# Patient Record
Sex: FEMALE | Race: WHITE | NOT HISPANIC OR LATINO | Employment: FULL TIME | URBAN - METROPOLITAN AREA
[De-identification: names, ages, dates, MRNs, and addresses within clinical notes are randomized per-mention and may not be internally consistent; named-entity substitution may affect disease eponyms.]

---

## 2023-03-13 LAB
THYROTROPIN (MIU/L) IN SER/PLAS BY DETECTION LIMIT <= 0.05 MIU/L: 5.3 MIU/L (ref 0.44–3.98)
THYROXINE (T4) FREE (NG/DL) IN SER/PLAS: 0.86 NG/DL (ref 0.61–1.12)

## 2023-04-10 LAB — PROGESTERONE (NG/ML) IN SER/PLAS: 8.6 NG/ML

## 2023-04-24 LAB — THYROTROPIN (MIU/L) IN SER/PLAS BY DETECTION LIMIT <= 0.05 MIU/L: 2.54 MIU/L (ref 0.44–3.98)

## 2023-05-08 LAB — PROGESTERONE (NG/ML) IN SER/PLAS: 1.2 NG/ML

## 2023-05-16 LAB
ESTRADIOL (PG/ML) IN SER/PLAS: 37 PG/ML
PROGESTERONE (NG/ML) IN SER/PLAS: 1.6 NG/ML

## 2023-05-19 LAB
ESTRADIOL (PG/ML) IN SER/PLAS: 171 PG/ML
LUTEINIZING HORMONE (IU/ML) IN SER/PLAS: 5.5 IU/L
PROGESTERONE (NG/ML) IN SER/PLAS: 0.7 NG/ML

## 2023-05-20 LAB
ESTRADIOL (PG/ML) IN SER/PLAS: 236 PG/ML
ESTRADIOL (PG/ML) IN SER/PLAS: NORMAL
LUTEINIZING HORMONE (IU/ML) IN SER/PLAS: 6 IU/L
LUTEINIZING HORMONE (IU/ML) IN SER/PLAS: NORMAL
PROGESTERONE (NG/ML) IN SER/PLAS: 0.3 NG/ML
PROGESTERONE (NG/ML) IN SER/PLAS: NORMAL

## 2023-06-06 LAB — CHORIOGONADOTROPIN (MIU/ML) IN SER/PLAS: 382 MIU/ML

## 2023-06-13 LAB — CHORIOGONADOTROPIN (MIU/ML) IN SER/PLAS: 6246 MIU/ML

## 2023-07-06 LAB
ERYTHROCYTE DISTRIBUTION WIDTH (RATIO) BY AUTOMATED COUNT: 13.4 % (ref 11.5–14.5)
ERYTHROCYTE MEAN CORPUSCULAR HEMOGLOBIN CONCENTRATION (G/DL) BY AUTOMATED: 33.2 G/DL (ref 32–36)
ERYTHROCYTE MEAN CORPUSCULAR VOLUME (FL) BY AUTOMATED COUNT: 88 FL (ref 80–100)
ERYTHROCYTES (10*6/UL) IN BLOOD BY AUTOMATED COUNT: 4.21 X10E12/L (ref 4–5.2)
HEMATOCRIT (%) IN BLOOD BY AUTOMATED COUNT: 37 % (ref 36–46)
HEMOGLOBIN (G/DL) IN BLOOD: 12.3 G/DL (ref 12–16)
HEPATITIS B VIRUS SURFACE AG PRESENCE IN SERUM: NONREACTIVE
HEPATITIS C VIRUS AB PRESENCE IN SERUM: NONREACTIVE
HIV 1/ 2 AG/AB SCREEN: NONREACTIVE
LEUKOCYTES (10*3/UL) IN BLOOD BY AUTOMATED COUNT: 6.9 X10E9/L (ref 4.4–11.3)
NRBC (PER 100 WBCS) BY AUTOMATED COUNT: 0 /100 WBC (ref 0–0)
PLATELETS (10*3/UL) IN BLOOD AUTOMATED COUNT: 276 X10E9/L (ref 150–450)
REFLEX ADDED, ANEMIA PANEL: NORMAL
RUBELLA VIRUS IGG AB: POSITIVE
SYPHILIS TOTAL AB: NONREACTIVE
THYROTROPIN (MIU/L) IN SER/PLAS BY DETECTION LIMIT <= 0.05 MIU/L: 2.53 MIU/L (ref 0.44–3.98)

## 2023-07-10 LAB
ABO GROUP (TYPE) IN BLOOD: NORMAL
ANTIBODY SCREEN: NORMAL
RH FACTOR: NORMAL

## 2023-07-21 LAB
CHLAMYDIA TRACH., AMPLIFIED: NEGATIVE
N. GONORRHEA, AMPLIFIED: NEGATIVE
URINE CULTURE: NORMAL

## 2023-10-05 VITALS — BODY MASS INDEX: 26.87 KG/M2 | WEIGHT: 159 LBS | SYSTOLIC BLOOD PRESSURE: 104 MMHG | DIASTOLIC BLOOD PRESSURE: 60 MMHG

## 2023-10-05 PROBLEM — L68.0 HIRSUTISM: Status: ACTIVE | Noted: 2023-10-05

## 2023-10-05 PROBLEM — E03.9 HYPOTHYROIDISM: Status: ACTIVE | Noted: 2023-10-05

## 2023-10-05 PROBLEM — L85.8 KERATOSIS PILARIS: Status: ACTIVE | Noted: 2023-10-05

## 2023-10-05 PROBLEM — T78.40XA ACUTE ALLERGIC REACTION: Status: ACTIVE | Noted: 2023-10-05

## 2023-10-05 PROBLEM — N63.0 BREAST NODULE: Status: ACTIVE | Noted: 2023-10-05

## 2023-10-05 PROBLEM — R45.89 MOODY: Status: ACTIVE | Noted: 2023-10-05

## 2023-10-05 PROBLEM — K59.09 INTERMITTENT CONSTIPATION: Status: ACTIVE | Noted: 2023-10-05

## 2023-10-05 PROBLEM — E66.3 OVERWEIGHT: Status: ACTIVE | Noted: 2023-10-05

## 2023-10-05 PROBLEM — H61.21 IMPACTED CERUMEN OF RIGHT EAR: Status: ACTIVE | Noted: 2023-10-05

## 2023-10-05 RX ORDER — ASPIRIN 325 MG
325 TABLET ORAL DAILY
COMMUNITY
End: 2023-10-22 | Stop reason: HOSPADM

## 2023-10-05 RX ORDER — LEVOTHYROXINE SODIUM 100 UG/1
100 TABLET ORAL
COMMUNITY
End: 2023-10-22 | Stop reason: HOSPADM

## 2023-10-05 NOTE — PROGRESS NOTES
"ENOB \"FEELS GOOD\", spotting 2 days after last scan. This week nothing.    Routine prenatal care.   Prequel, Foresight already done.  GC/CT  urine cx  no pap   mg 12-16 weeks. -Deidre Villalobos 07/20/2023 04:10 PM  "

## 2023-10-16 ENCOUNTER — ROUTINE PRENATAL (OUTPATIENT)
Dept: OBSTETRICS AND GYNECOLOGY | Facility: CLINIC | Age: 38
End: 2023-10-16
Payer: COMMERCIAL

## 2023-10-16 VITALS — WEIGHT: 169 LBS | BODY MASS INDEX: 28.56 KG/M2 | SYSTOLIC BLOOD PRESSURE: 99 MMHG | DIASTOLIC BLOOD PRESSURE: 61 MMHG

## 2023-10-16 DIAGNOSIS — Z34.02 ENCOUNTER FOR PRENATAL CARE IN SECOND TRIMESTER OF FIRST PREGNANCY (HHS-HCC): Primary | ICD-10-CM

## 2023-10-16 DIAGNOSIS — E03.8 HYPOTHYROIDISM DUE TO HASHIMOTO'S THYROIDITIS: ICD-10-CM

## 2023-10-16 DIAGNOSIS — Z3A.22 22 WEEKS GESTATION OF PREGNANCY (HHS-HCC): ICD-10-CM

## 2023-10-16 DIAGNOSIS — N97.9 PRIMARY FEMALE INFERTILITY: ICD-10-CM

## 2023-10-16 DIAGNOSIS — E06.3 HYPOTHYROIDISM DUE TO HASHIMOTO'S THYROIDITIS: ICD-10-CM

## 2023-10-16 DIAGNOSIS — O09.522 MULTIGRAVIDA OF ADVANCED MATERNAL AGE IN SECOND TRIMESTER (HHS-HCC): ICD-10-CM

## 2023-10-16 PROCEDURE — 99214 OFFICE O/P EST MOD 30 MIN: CPT | Performed by: ADVANCED PRACTICE MIDWIFE

## 2023-10-16 PROCEDURE — 0501F PRENATAL FLOW SHEET: CPT | Performed by: ADVANCED PRACTICE MIDWIFE

## 2023-10-16 ASSESSMENT — ENCOUNTER SYMPTOMS
MUSCULOSKELETAL NEGATIVE: 0
EYES NEGATIVE: 0
NEUROLOGICAL NEGATIVE: 0
ENDOCRINE NEGATIVE: 0
RESPIRATORY NEGATIVE: 0
CARDIOVASCULAR NEGATIVE: 0
HEMATOLOGIC/LYMPHATIC NEGATIVE: 0
PSYCHIATRIC NEGATIVE: 0
GASTROINTESTINAL NEGATIVE: 0
CONSTITUTIONAL NEGATIVE: 0
ALLERGIC/IMMUNOLOGIC NEGATIVE: 0

## 2023-10-16 NOTE — PROGRESS NOTES
Assessment/Plan         Discussed flu vaccine and RSV vaccine - patient desires to get at retail pharmacy  28 week labs and TSH with reflex T4 @ [facility]  Reviewed s/sx of PTL, warning signs, fetal movement counts, and when to call provider  Follow up in 4 weeks for a routine prenatal visit.    Tabitha Vazquez, AVA-MAEGAN    Subjective     Pauline Sun is a 38 y.o.  at 22w6d with a working estimated date of delivery of 2024, by Last Menstrual Period who presents for a routine prenatal visit. She denies vaginal bleeding, leakage of fluid, decreased fetal movements, or contractions.    Her pregnancy is complicated by:  Pregnancy Problems (from 23 to present)       No problems associated with this episode.             Objective   Physical Exam  Weight: 76.7 kg (169 lb)  TW.443 kg (12 lb)  Expected Total Weight Gain: 7 kg (15 lb)-11.5 kg (25 lb)   Pregravid BMI: 26.54  BP: 99/61         Postpartum Depression: Not on file       Prenatal Labs  Lab Results   Component Value Date    HGB 12.3 2023    HCT 37.0 2023     2023    ABO A 07/10/2023    LABRH POS 07/10/2023    NEISSGONOAMP NEGATIVE 2023    CHLAMTRACAMP NEGATIVE 2023    SYPHT NONREACTIVE 2023    HEPBSAG NONREACTIVE 2023    HIV1X2 NONREACTIVE 2023    URINECULTURE NO SIGNIFICANT GROWTH. 2023

## 2023-10-22 ENCOUNTER — HOSPITAL ENCOUNTER (INPATIENT)
Facility: HOSPITAL | Age: 38
LOS: 2 days | Discharge: HOME | DRG: 833 | End: 2023-10-24
Attending: OBSTETRICS & GYNECOLOGY | Admitting: OBSTETRICS & GYNECOLOGY
Payer: COMMERCIAL

## 2023-10-22 ENCOUNTER — HOSPITAL ENCOUNTER (OUTPATIENT)
Facility: HOSPITAL | Age: 38
Discharge: HOSPICE | End: 2023-10-22
Attending: OBSTETRICS & GYNECOLOGY | Admitting: OBSTETRICS & GYNECOLOGY
Payer: COMMERCIAL

## 2023-10-22 VITALS
SYSTOLIC BLOOD PRESSURE: 105 MMHG | RESPIRATION RATE: 18 BRPM | DIASTOLIC BLOOD PRESSURE: 63 MMHG | OXYGEN SATURATION: 100 % | TEMPERATURE: 97.7 F | HEART RATE: 80 BPM

## 2023-10-22 DIAGNOSIS — O47.02: Primary | ICD-10-CM

## 2023-10-22 PROBLEM — N93.9 VAGINAL BLEEDING: Status: ACTIVE | Noted: 2023-10-22

## 2023-10-22 LAB
APTT PPP: 27 SECONDS (ref 27–38)
BASOPHILS # BLD AUTO: 0.02 X10*3/UL (ref 0–0.1)
BASOPHILS NFR BLD AUTO: 0.2 %
EOSINOPHIL # BLD AUTO: 0.15 X10*3/UL (ref 0–0.7)
EOSINOPHIL NFR BLD AUTO: 1.8 %
ERYTHROCYTE [DISTWIDTH] IN BLOOD BY AUTOMATED COUNT: 13.6 % (ref 11.5–14.5)
FIBRINOGEN PPP-MCNC: 549 MG/DL (ref 200–400)
HCT VFR BLD AUTO: 31.2 % (ref 36–46)
HGB BLD-MCNC: 10 G/DL (ref 12–16)
IMM GRANULOCYTES # BLD AUTO: 0.11 X10*3/UL (ref 0–0.7)
IMM GRANULOCYTES NFR BLD AUTO: 1.3 % (ref 0–0.9)
INR PPP: 0.9 (ref 0.9–1.1)
LYMPHOCYTES # BLD AUTO: 1.2 X10*3/UL (ref 1.2–4.8)
LYMPHOCYTES NFR BLD AUTO: 14 %
MCH RBC QN AUTO: 28.7 PG (ref 26–34)
MCHC RBC AUTO-ENTMCNC: 32.1 G/DL (ref 32–36)
MCV RBC AUTO: 90 FL (ref 80–100)
MONOCYTES # BLD AUTO: 0.51 X10*3/UL (ref 0.1–1)
MONOCYTES NFR BLD AUTO: 6 %
NEUTROPHILS # BLD AUTO: 6.56 X10*3/UL (ref 1.2–7.7)
NEUTROPHILS NFR BLD AUTO: 76.7 %
NRBC BLD-RTO: 0 /100 WBCS (ref 0–0)
PLATELET # BLD AUTO: 302 X10*3/UL (ref 150–450)
PMV BLD AUTO: 10.1 FL (ref 7.5–11.5)
PROTHROMBIN TIME: 10.3 SECONDS (ref 9.8–12.8)
RBC # BLD AUTO: 3.48 X10*6/UL (ref 4–5.2)
WBC # BLD AUTO: 8.6 X10*3/UL (ref 4.4–11.3)

## 2023-10-22 PROCEDURE — 96372 THER/PROPH/DIAG INJ SC/IM: CPT | Performed by: OBSTETRICS & GYNECOLOGY

## 2023-10-22 PROCEDURE — 99204 OFFICE O/P NEW MOD 45 MIN: CPT | Performed by: OBSTETRICS & GYNECOLOGY

## 2023-10-22 PROCEDURE — 87081 CULTURE SCREEN ONLY: CPT | Performed by: OBSTETRICS & GYNECOLOGY

## 2023-10-22 PROCEDURE — 85384 FIBRINOGEN ACTIVITY: CPT | Performed by: OBSTETRICS & GYNECOLOGY

## 2023-10-22 PROCEDURE — 99214 OFFICE O/P EST MOD 30 MIN: CPT

## 2023-10-22 PROCEDURE — 86920 COMPATIBILITY TEST SPIN: CPT

## 2023-10-22 PROCEDURE — 87210 SMEAR WET MOUNT SALINE/INK: CPT | Mod: CMCLAB | Performed by: STUDENT IN AN ORGANIZED HEALTH CARE EDUCATION/TRAINING PROGRAM

## 2023-10-22 PROCEDURE — 85027 COMPLETE CBC AUTOMATED: CPT | Mod: CMCLAB | Performed by: STUDENT IN AN ORGANIZED HEALTH CARE EDUCATION/TRAINING PROGRAM

## 2023-10-22 PROCEDURE — 2500000004 HC RX 250 GENERAL PHARMACY W/ HCPCS (ALT 636 FOR OP/ED): Performed by: OBSTETRICS & GYNECOLOGY

## 2023-10-22 PROCEDURE — 36415 COLL VENOUS BLD VENIPUNCTURE: CPT | Mod: CMCLAB | Performed by: STUDENT IN AN ORGANIZED HEALTH CARE EDUCATION/TRAINING PROGRAM

## 2023-10-22 PROCEDURE — 87081 CULTURE SCREEN ONLY: CPT | Mod: CMCLAB | Performed by: OBSTETRICS & GYNECOLOGY

## 2023-10-22 PROCEDURE — 1120000001 HC OB PRIVATE ROOM DAILY

## 2023-10-22 PROCEDURE — 99223 1ST HOSP IP/OBS HIGH 75: CPT | Performed by: STUDENT IN AN ORGANIZED HEALTH CARE EDUCATION/TRAINING PROGRAM

## 2023-10-22 PROCEDURE — 86900 BLOOD TYPING SEROLOGIC ABO: CPT | Performed by: STUDENT IN AN ORGANIZED HEALTH CARE EDUCATION/TRAINING PROGRAM

## 2023-10-22 PROCEDURE — 36415 COLL VENOUS BLD VENIPUNCTURE: CPT | Performed by: OBSTETRICS & GYNECOLOGY

## 2023-10-22 PROCEDURE — 85610 PROTHROMBIN TIME: CPT | Mod: CMCLAB | Performed by: STUDENT IN AN ORGANIZED HEALTH CARE EDUCATION/TRAINING PROGRAM

## 2023-10-22 PROCEDURE — 85610 PROTHROMBIN TIME: CPT | Performed by: OBSTETRICS & GYNECOLOGY

## 2023-10-22 PROCEDURE — 85384 FIBRINOGEN ACTIVITY: CPT | Mod: CMCLAB | Performed by: STUDENT IN AN ORGANIZED HEALTH CARE EDUCATION/TRAINING PROGRAM

## 2023-10-22 PROCEDURE — 96372 THER/PROPH/DIAG INJ SC/IM: CPT

## 2023-10-22 PROCEDURE — 2580000001 HC RX 258 IV SOLUTIONS: Performed by: OBSTETRICS & GYNECOLOGY

## 2023-10-22 PROCEDURE — 85025 COMPLETE CBC W/AUTO DIFF WBC: CPT | Performed by: OBSTETRICS & GYNECOLOGY

## 2023-10-22 RX ORDER — LIDOCAINE HYDROCHLORIDE 10 MG/ML
0.5 INJECTION INFILTRATION; PERINEURAL ONCE AS NEEDED
Status: DISCONTINUED | OUTPATIENT
Start: 2023-10-22 | End: 2023-10-24 | Stop reason: HOSPADM

## 2023-10-22 RX ORDER — METOCLOPRAMIDE 10 MG/1
10 TABLET ORAL EVERY 6 HOURS PRN
Status: DISCONTINUED | OUTPATIENT
Start: 2023-10-22 | End: 2023-10-24 | Stop reason: HOSPADM

## 2023-10-22 RX ORDER — SIMETHICONE 80 MG
80 TABLET,CHEWABLE ORAL 4 TIMES DAILY PRN
Status: DISCONTINUED | OUTPATIENT
Start: 2023-10-22 | End: 2023-10-24 | Stop reason: HOSPADM

## 2023-10-22 RX ORDER — LEVOTHYROXINE SODIUM 100 UG/1
100 TABLET ORAL
COMMUNITY
End: 2023-10-31

## 2023-10-22 RX ORDER — ONDANSETRON 4 MG/1
4 TABLET, FILM COATED ORAL EVERY 6 HOURS PRN
Status: DISCONTINUED | OUTPATIENT
Start: 2023-10-22 | End: 2023-10-24 | Stop reason: HOSPADM

## 2023-10-22 RX ORDER — ADHESIVE BANDAGE
10 BANDAGE TOPICAL
Status: DISCONTINUED | OUTPATIENT
Start: 2023-10-22 | End: 2023-10-24 | Stop reason: HOSPADM

## 2023-10-22 RX ORDER — METOCLOPRAMIDE HYDROCHLORIDE 5 MG/ML
10 INJECTION INTRAMUSCULAR; INTRAVENOUS EVERY 6 HOURS PRN
Status: DISCONTINUED | OUTPATIENT
Start: 2023-10-22 | End: 2023-10-24 | Stop reason: HOSPADM

## 2023-10-22 RX ORDER — LABETALOL HYDROCHLORIDE 5 MG/ML
20 INJECTION, SOLUTION INTRAVENOUS ONCE AS NEEDED
Status: DISCONTINUED | OUTPATIENT
Start: 2023-10-22 | End: 2023-10-24 | Stop reason: HOSPADM

## 2023-10-22 RX ORDER — HYDRALAZINE HYDROCHLORIDE 20 MG/ML
5 INJECTION INTRAMUSCULAR; INTRAVENOUS ONCE AS NEEDED
Status: DISCONTINUED | OUTPATIENT
Start: 2023-10-22 | End: 2023-10-24 | Stop reason: HOSPADM

## 2023-10-22 RX ORDER — LEVOTHYROXINE SODIUM 100 UG/1
100 TABLET ORAL
Status: DISCONTINUED | OUTPATIENT
Start: 2023-10-23 | End: 2023-10-24 | Stop reason: HOSPADM

## 2023-10-22 RX ORDER — NIFEDIPINE 10 MG/1
10 CAPSULE ORAL ONCE AS NEEDED
Status: DISCONTINUED | OUTPATIENT
Start: 2023-10-22 | End: 2023-10-24 | Stop reason: HOSPADM

## 2023-10-22 RX ORDER — SODIUM CHLORIDE, SODIUM LACTATE, POTASSIUM CHLORIDE, CALCIUM CHLORIDE 600; 310; 30; 20 MG/100ML; MG/100ML; MG/100ML; MG/100ML
1000 INJECTION, SOLUTION INTRAVENOUS ONCE
Status: COMPLETED | OUTPATIENT
Start: 2023-10-22 | End: 2023-10-22

## 2023-10-22 RX ORDER — NAPROXEN SODIUM 220 MG/1
162 TABLET, FILM COATED ORAL DAILY
Status: DISCONTINUED | OUTPATIENT
Start: 2023-10-23 | End: 2023-10-24 | Stop reason: HOSPADM

## 2023-10-22 RX ORDER — BISACODYL 10 MG/1
10 SUPPOSITORY RECTAL DAILY PRN
Status: DISCONTINUED | OUTPATIENT
Start: 2023-10-22 | End: 2023-10-24 | Stop reason: HOSPADM

## 2023-10-22 RX ORDER — ONDANSETRON HYDROCHLORIDE 2 MG/ML
4 INJECTION, SOLUTION INTRAVENOUS EVERY 6 HOURS PRN
Status: DISCONTINUED | OUTPATIENT
Start: 2023-10-22 | End: 2023-10-24 | Stop reason: HOSPADM

## 2023-10-22 RX ORDER — POLYETHYLENE GLYCOL 3350 17 G/17G
17 POWDER, FOR SOLUTION ORAL 2 TIMES DAILY PRN
Status: DISCONTINUED | OUTPATIENT
Start: 2023-10-22 | End: 2023-10-24 | Stop reason: HOSPADM

## 2023-10-22 RX ORDER — BETAMETHASONE SODIUM PHOSPHATE AND BETAMETHASONE ACETATE 3; 3 MG/ML; MG/ML
12 INJECTION, SUSPENSION INTRA-ARTICULAR; INTRALESIONAL; INTRAMUSCULAR; SOFT TISSUE ONCE
Status: COMPLETED | OUTPATIENT
Start: 2023-10-22 | End: 2023-10-22

## 2023-10-22 RX ADMIN — BETAMETHASONE SODIUM PHOSPHATE AND BETAMETHASONE ACETATE 12 MG: 3; 3 INJECTION, SUSPENSION INTRA-ARTICULAR; INTRALESIONAL; INTRAMUSCULAR at 18:44

## 2023-10-22 RX ADMIN — SODIUM CHLORIDE, POTASSIUM CHLORIDE, SODIUM LACTATE AND CALCIUM CHLORIDE 1000 ML/HR: 600; 310; 30; 20 INJECTION, SOLUTION INTRAVENOUS at 16:14

## 2023-10-22 RX ADMIN — SODIUM CHLORIDE, SODIUM LACTATE, POTASSIUM CHLORIDE, CALCIUM CHLORIDE AND DEXTROSE MONOHYDRATE 1000 ML: 5; 600; 310; 30; 20 INJECTION, SOLUTION INTRAVENOUS at 18:44

## 2023-10-22 SDOH — HEALTH STABILITY: MENTAL HEALTH: ACTIVE SUICIDAL IDEATION WITH SPECIFIC PLAN AND INTENT (PAST 1 MONTH): NO

## 2023-10-22 SDOH — SOCIAL STABILITY: SOCIAL INSECURITY: ARE YOU OR HAVE YOU BEEN THREATENED OR ABUSED PHYSICALLY, EMOTIONALLY, OR SEXUALLY BY ANYONE?: NO

## 2023-10-22 SDOH — HEALTH STABILITY: MENTAL HEALTH: SUICIDAL BEHAVIOR (LIFETIME): NO

## 2023-10-22 SDOH — HEALTH STABILITY: MENTAL HEALTH: WERE YOU ABLE TO COMPLETE ALL THE BEHAVIORAL HEALTH SCREENINGS?: YES

## 2023-10-22 SDOH — SOCIAL STABILITY: SOCIAL INSECURITY: ARE THERE ANY APPARENT SIGNS OF INJURIES/BEHAVIORS THAT COULD BE RELATED TO ABUSE/NEGLECT?: NO

## 2023-10-22 SDOH — SOCIAL STABILITY: SOCIAL INSECURITY: DO YOU FEEL ANYONE HAS EXPLOITED OR TAKEN ADVANTAGE OF YOU FINANCIALLY OR OF YOUR PERSONAL PROPERTY?: NO

## 2023-10-22 SDOH — SOCIAL STABILITY: SOCIAL INSECURITY: PHYSICAL ABUSE: DENIES

## 2023-10-22 SDOH — HEALTH STABILITY: MENTAL HEALTH: HAVE YOU USED ANY PRESCRIPTION DRUGS OTHER THAN PRESCRIBED IN THE PAST 12 MONTHS?: NO

## 2023-10-22 SDOH — HEALTH STABILITY: MENTAL HEALTH: HAVE YOU USED ANY SUBSTANCES (CANABIS, COCAINE, HEROIN, HALLUCINOGENS, INHALANTS, ETC.) IN THE PAST 12 MONTHS?: NO

## 2023-10-22 SDOH — HEALTH STABILITY: MENTAL HEALTH: WISH TO BE DEAD (PAST 1 MONTH): NO

## 2023-10-22 SDOH — HEALTH STABILITY: MENTAL HEALTH: NON-SPECIFIC ACTIVE SUICIDAL THOUGHTS (PAST 1 MONTH): NO

## 2023-10-22 SDOH — SOCIAL STABILITY: SOCIAL INSECURITY: HAS ANYONE EVER THREATENED TO HURT YOUR FAMILY OR YOUR PETS?: NO

## 2023-10-22 SDOH — SOCIAL STABILITY: SOCIAL INSECURITY: VERBAL ABUSE: DENIES

## 2023-10-22 SDOH — SOCIAL STABILITY: SOCIAL INSECURITY: HAVE YOU HAD THOUGHTS OF HARMING ANYONE ELSE?: NO

## 2023-10-22 SDOH — SOCIAL STABILITY: SOCIAL INSECURITY: DOES ANYONE TRY TO KEEP YOU FROM HAVING/CONTACTING OTHER FRIENDS OR DOING THINGS OUTSIDE YOUR HOME?: NO

## 2023-10-22 SDOH — HEALTH STABILITY: MENTAL HEALTH: ACTIVE SUICIDAL IDEATION WITH SOME INTENT TO ACT, WITHOUT SPECIFIC PLAN (PAST 1 MONTH): NO

## 2023-10-22 SDOH — ECONOMIC STABILITY: HOUSING INSECURITY: DO YOU FEEL UNSAFE GOING BACK TO THE PLACE WHERE YOU ARE LIVING?: NO

## 2023-10-22 SDOH — SOCIAL STABILITY: SOCIAL INSECURITY: ABUSE SCREEN: ADULT

## 2023-10-22 ASSESSMENT — LIFESTYLE VARIABLES
AUDIT-C TOTAL SCORE: 0
HOW OFTEN DO YOU HAVE 6 OR MORE DRINKS ON ONE OCCASION: NEVER
HOW MANY STANDARD DRINKS CONTAINING ALCOHOL DO YOU HAVE ON A TYPICAL DAY: PATIENT DOES NOT DRINK
HOW OFTEN DO YOU HAVE A DRINK CONTAINING ALCOHOL: NEVER
AUDIT-C TOTAL SCORE: 0
SKIP TO QUESTIONS 9-10: 1

## 2023-10-22 ASSESSMENT — PATIENT HEALTH QUESTIONNAIRE - PHQ9
SUM OF ALL RESPONSES TO PHQ9 QUESTIONS 1 & 2: 0
1. LITTLE INTEREST OR PLEASURE IN DOING THINGS: NOT AT ALL
2. FEELING DOWN, DEPRESSED OR HOPELESS: NOT AT ALL

## 2023-10-22 ASSESSMENT — PAIN SCALES - GENERAL
PAINLEVEL_OUTOF10: 0 - NO PAIN

## 2023-10-22 NOTE — LETTER
October 24, 2023     Patient: Pauline Sun   YOB: 1985   Date of Visit: 10/22/2023       To Whom It May Concern:    It is my medical opinion that Pauline Sun may return to work on 11/1/23 . When she returns, she should have frequent sitting breaks and no lifting/pushing/pulling greater than 10lbs.    If you have any questions or concerns, please don't hesitate to call.         Sincerely,        Roxane Deng MD      CC: No Recipients

## 2023-10-22 NOTE — NURSING NOTE
Urine dip results  GLU neg  NIKKI neg  KET +3  SG 1.015  BLO 3+  pH 6.0  PRO neg  URO 0.2  NIT neg  ARI  Trace

## 2023-10-22 NOTE — H&P
Obstetrical Admission History and Physical    Patient is a 38-year-old female  1 para 0 with IVF pregnancy dated by early ultrasound and currently 23 weeks and 5 days    She noted 1 small blood clot yesterday.  Now with increased spotting and discharge.  Feeling some contractions.  Reports fetal movement as normal.    She denies any dysuria.  She denies any recent intercourse.  She denies any STD history or significant vaginitis        Obstetrical History   OB History    Para Term  AB Living   1 0 0 0 0 0   SAB IAB Ectopic Multiple Live Births   0 0 0 0 0      # Outcome Date GA Lbr Donell/2nd Weight Sex Delivery Anes PTL Lv   1 Current               Obstetric Comments   AMA, 38 years old.  2023 03:51 PM - PU        NSTs at 36 weeks   2023 11:03 AM - LZ [id number] false        growth 30, 36 weeks   2023 11:02 AM - LZ [id number] false        sp RR cfDNA - BOY   2023 10:52 AM - LZ       A1C 2022 5.6%   2023 10:51 AM - LZ     BLOOD TYPE IS A+  2023 01:24 PM - MP     Clomid, Letrozole and IUI.  2023 03:52 PM - PU     Foresight negative  07/10/2023 12:47 PM - RS     Hashimoto's on Levo 100mcg qd         Past Medical History  Past Medical History:   Diagnosis Date    Encounter for fertility testing 2022    Fertility testing    Hirsutism     Personal history of other diseases of the respiratory system     Personal history of asthma    Personal history of other diseases of the respiratory system     History of asthma    Personal history of other endocrine, nutritional and metabolic disease 2022    History of hypothyroidism    Personal history of other endocrine, nutritional and metabolic disease 2022    History of hypothyroidism    Varicella without complication     Varicella without complication        Past Surgical History   Past Surgical History:   Procedure Laterality Date    BREAST BIOPSY      POLYPECTOMY         Social History  Social  History     Tobacco Use    Smoking status: Former     Types: Cigarettes     Quit date:      Years since quittin.8    Smokeless tobacco: Not on file   Substance Use Topics    Alcohol use: Not Currently     Substance and Sexual Activity   Drug Use Never       Allergies  Patient has no known allergies.     Medications  Medications Prior to Admission   Medication Sig Dispense Refill Last Dose    aspirin 325 mg tablet Take 1 tablet (325 mg) by mouth once daily.   10/21/2023    levothyroxine (Synthroid, Levoxyl) 100 mcg tablet Take 1 tablet (100 mcg) by mouth once daily in the morning. Take before meals.   10/22/2023    magnesium 30 mg tablet Take 1 tablet (30 mg) by mouth 2 times a day.   10/21/2023    prenatal no115/iron/folic acid (PRENATAL 19 ORAL) Take by mouth.   10/21/2023       Objective    Last Vitals  Temp Pulse Resp BP MAP O2 Sat   36.6 °C (97.9 °F) 74 18 106/60   97 %     Physical Examination  Abdomen is soft nondistended.  Contractions are palpated moderate in intensity.  Fetal heart tones with good [id number] variability and excels appropriate to gestational age  Pelvic exam: External genitalia Bartholin's urethra and Cameron's normal.  Vaginal vault with scant blood in the posterior fornix.  Sterile speculum exam reveals the cervix to be fingertip with blood in the os.  Cultures performed    Nitrazine negative fern negative no pooling    Assessment 23-week and 5-day pregnancy with  contractions and vaginal bleeding.  Cannot rule out abruption but fetal heart tones are reassuring  We will give IV fluids and continue to monitor.  Check urine for signs of infection  We will perform bedside ultrasound  Monitor closely and reevaluate in 1 to 2 hours    Patient checked again.  Still with contractions on the monitor.  Palpating at the bedside.  Scant bleeding in the vagina.  Cervix unchanged close soft 40% effaced.  Fetal heart tones remain normal for gestational age    Discussed with patient  miguel and bleeding.  Cannot rule out abruption.  Fetus currently stable.  Will transfer to main [facility] for continued monitoring and MFM input

## 2023-10-23 ENCOUNTER — APPOINTMENT (OUTPATIENT)
Dept: RADIOLOGY | Facility: HOSPITAL | Age: 38
DRG: 833 | End: 2023-10-23
Payer: COMMERCIAL

## 2023-10-23 LAB
ABO GROUP (TYPE) IN BLOOD: NORMAL
ANTIBODY SCREEN: NORMAL
APTT PPP: 22 SECONDS (ref 27–38)
APTT PPP: 27 SECONDS (ref 27–38)
CLUE CELLS SPEC QL WET PREP: NORMAL
ERYTHROCYTE [DISTWIDTH] IN BLOOD BY AUTOMATED COUNT: 13.5 % (ref 11.5–14.5)
ERYTHROCYTE [DISTWIDTH] IN BLOOD BY AUTOMATED COUNT: 13.7 % (ref 11.5–14.5)
FIBRINOGEN PPP-MCNC: 383 MG/DL (ref 200–400)
FIBRINOGEN PPP-MCNC: 439 MG/DL (ref 200–400)
HCT VFR BLD AUTO: 30 % (ref 36–46)
HCT VFR BLD AUTO: 30.2 % (ref 36–46)
HGB BLD-MCNC: 9.5 G/DL (ref 12–16)
HGB BLD-MCNC: 9.6 G/DL (ref 12–16)
INR PPP: 1 (ref 0.9–1.1)
INR PPP: 1 (ref 0.9–1.1)
MCH RBC QN AUTO: 28.7 PG (ref 26–34)
MCH RBC QN AUTO: 28.8 PG (ref 26–34)
MCHC RBC AUTO-ENTMCNC: 31.5 G/DL (ref 32–36)
MCHC RBC AUTO-ENTMCNC: 32 G/DL (ref 32–36)
MCV RBC AUTO: 90 FL (ref 80–100)
MCV RBC AUTO: 92 FL (ref 80–100)
NRBC BLD-RTO: 0 /100 WBCS (ref 0–0)
NRBC BLD-RTO: 0 /100 WBCS (ref 0–0)
PLATELET # BLD AUTO: 261 X10*3/UL (ref 150–450)
PLATELET # BLD AUTO: 269 X10*3/UL (ref 150–450)
PMV BLD AUTO: 10 FL (ref 7.5–11.5)
PMV BLD AUTO: 9.7 FL (ref 7.5–11.5)
PROTHROMBIN TIME: 10.9 SECONDS (ref 9.8–12.8)
PROTHROMBIN TIME: 11.1 SECONDS (ref 9.8–12.8)
RBC # BLD AUTO: 3.3 X10*6/UL (ref 4–5.2)
RBC # BLD AUTO: 3.34 X10*6/UL (ref 4–5.2)
RH FACTOR (ANTIGEN D): NORMAL
T VAGINALIS SPEC QL WET PREP: NORMAL
TRICHOMONAS REFLEX COMMENT: NORMAL
WBC # BLD AUTO: 7.7 X10*3/UL (ref 4.4–11.3)
WBC # BLD AUTO: 8.2 X10*3/UL (ref 4.4–11.3)
WBC VAG QL WET PREP: NORMAL
YEAST VAG QL WET PREP: NORMAL

## 2023-10-23 PROCEDURE — 99222 1ST HOSP IP/OBS MODERATE 55: CPT

## 2023-10-23 PROCEDURE — 85610 PROTHROMBIN TIME: CPT | Performed by: STUDENT IN AN ORGANIZED HEALTH CARE EDUCATION/TRAINING PROGRAM

## 2023-10-23 PROCEDURE — 76816 OB US FOLLOW-UP PER FETUS: CPT | Performed by: OBSTETRICS & GYNECOLOGY

## 2023-10-23 PROCEDURE — 1210000001 HC SEMI-PRIVATE ROOM DAILY

## 2023-10-23 PROCEDURE — 2500000001 HC RX 250 WO HCPCS SELF ADMINISTERED DRUGS (ALT 637 FOR MEDICARE OP)

## 2023-10-23 PROCEDURE — 96372 THER/PROPH/DIAG INJ SC/IM: CPT

## 2023-10-23 PROCEDURE — 2500000004 HC RX 250 GENERAL PHARMACY W/ HCPCS (ALT 636 FOR OP/ED)

## 2023-10-23 PROCEDURE — 99232 SBSQ HOSP IP/OBS MODERATE 35: CPT | Performed by: STUDENT IN AN ORGANIZED HEALTH CARE EDUCATION/TRAINING PROGRAM

## 2023-10-23 PROCEDURE — 2500000001 HC RX 250 WO HCPCS SELF ADMINISTERED DRUGS (ALT 637 FOR MEDICARE OP): Performed by: STUDENT IN AN ORGANIZED HEALTH CARE EDUCATION/TRAINING PROGRAM

## 2023-10-23 PROCEDURE — 76816 OB US FOLLOW-UP PER FETUS: CPT

## 2023-10-23 PROCEDURE — 59025 FETAL NON-STRESS TEST: CPT

## 2023-10-23 PROCEDURE — 85027 COMPLETE CBC AUTOMATED: CPT | Performed by: STUDENT IN AN ORGANIZED HEALTH CARE EDUCATION/TRAINING PROGRAM

## 2023-10-23 PROCEDURE — 85384 FIBRINOGEN ACTIVITY: CPT | Performed by: STUDENT IN AN ORGANIZED HEALTH CARE EDUCATION/TRAINING PROGRAM

## 2023-10-23 PROCEDURE — 36415 COLL VENOUS BLD VENIPUNCTURE: CPT | Mod: CMCLAB | Performed by: STUDENT IN AN ORGANIZED HEALTH CARE EDUCATION/TRAINING PROGRAM

## 2023-10-23 PROCEDURE — 59025 FETAL NON-STRESS TEST: CPT | Mod: GC

## 2023-10-23 RX ORDER — BETAMETHASONE SODIUM PHOSPHATE AND BETAMETHASONE ACETATE 3; 3 MG/ML; MG/ML
12 INJECTION, SUSPENSION INTRA-ARTICULAR; INTRALESIONAL; INTRAMUSCULAR; SOFT TISSUE ONCE
Status: COMPLETED | OUTPATIENT
Start: 2023-10-23 | End: 2023-10-23

## 2023-10-23 RX ADMIN — BETAMETHASONE SODIUM PHOSPHATE AND BETAMETHASONE ACETATE 12 MG: 3; 3 INJECTION, SUSPENSION INTRA-ARTICULAR; INTRALESIONAL; INTRAMUSCULAR at 18:51

## 2023-10-23 RX ADMIN — ASPIRIN 81 MG CHEWABLE TABLET 162 MG: 81 TABLET CHEWABLE at 16:10

## 2023-10-23 RX ADMIN — LEVOTHYROXINE SODIUM 100 MCG: 100 TABLET ORAL at 08:40

## 2023-10-23 RX ADMIN — PRENATAL VIT W/ FE FUMARATE-FA TAB 27-0.8 MG 1 TABLET: 27-0.8 TAB at 16:11

## 2023-10-23 SDOH — HEALTH STABILITY: MENTAL HEALTH: ACTIVE SUICIDAL IDEATION WITH SOME INTENT TO ACT, WITHOUT SPECIFIC PLAN (PAST 1 MONTH): NO

## 2023-10-23 SDOH — SOCIAL STABILITY: SOCIAL INSECURITY: ARE YOU OR HAVE YOU BEEN THREATENED OR ABUSED PHYSICALLY, EMOTIONALLY, OR SEXUALLY BY ANYONE?: NO

## 2023-10-23 SDOH — HEALTH STABILITY: MENTAL HEALTH: WERE YOU ABLE TO COMPLETE ALL THE BEHAVIORAL HEALTH SCREENINGS?: YES

## 2023-10-23 SDOH — SOCIAL STABILITY: SOCIAL INSECURITY: DOES ANYONE TRY TO KEEP YOU FROM HAVING/CONTACTING OTHER FRIENDS OR DOING THINGS OUTSIDE YOUR HOME?: NO

## 2023-10-23 SDOH — HEALTH STABILITY: MENTAL HEALTH: WISH TO BE DEAD (PAST 1 MONTH): NO

## 2023-10-23 SDOH — SOCIAL STABILITY: SOCIAL INSECURITY: HAS ANYONE EVER THREATENED TO HURT YOUR FAMILY OR YOUR PETS?: NO

## 2023-10-23 SDOH — HEALTH STABILITY: MENTAL HEALTH: NON-SPECIFIC ACTIVE SUICIDAL THOUGHTS (PAST 1 MONTH): NO

## 2023-10-23 SDOH — SOCIAL STABILITY: SOCIAL INSECURITY: PHYSICAL ABUSE: DENIES

## 2023-10-23 SDOH — SOCIAL STABILITY: SOCIAL INSECURITY: ABUSE SCREEN: ADULT

## 2023-10-23 SDOH — SOCIAL STABILITY: SOCIAL INSECURITY: HAVE YOU HAD THOUGHTS OF HARMING ANYONE ELSE?: NO

## 2023-10-23 SDOH — SOCIAL STABILITY: SOCIAL INSECURITY: DO YOU FEEL ANYONE HAS EXPLOITED OR TAKEN ADVANTAGE OF YOU FINANCIALLY OR OF YOUR PERSONAL PROPERTY?: NO

## 2023-10-23 SDOH — SOCIAL STABILITY: SOCIAL INSECURITY: VERBAL ABUSE: DENIES

## 2023-10-23 SDOH — HEALTH STABILITY: MENTAL HEALTH: SUICIDAL BEHAVIOR (LIFETIME): NO

## 2023-10-23 SDOH — SOCIAL STABILITY: SOCIAL INSECURITY: ARE THERE ANY APPARENT SIGNS OF INJURIES/BEHAVIORS THAT COULD BE RELATED TO ABUSE/NEGLECT?: NO

## 2023-10-23 SDOH — ECONOMIC STABILITY: HOUSING INSECURITY: DO YOU FEEL UNSAFE GOING BACK TO THE PLACE WHERE YOU ARE LIVING?: NO

## 2023-10-23 ASSESSMENT — PATIENT HEALTH QUESTIONNAIRE - PHQ9
SUM OF ALL RESPONSES TO PHQ9 QUESTIONS 1 & 2: 0
2. FEELING DOWN, DEPRESSED OR HOPELESS: NOT AT ALL
1. LITTLE INTEREST OR PLEASURE IN DOING THINGS: NOT AT ALL

## 2023-10-23 ASSESSMENT — PAIN SCALES - GENERAL
PAINLEVEL_OUTOF10: 0 - NO PAIN

## 2023-10-23 ASSESSMENT — LIFESTYLE VARIABLES
HOW OFTEN DO YOU HAVE 6 OR MORE DRINKS ON ONE OCCASION: NEVER
SKIP TO QUESTIONS 9-10: 1
AUDIT-C TOTAL SCORE: 0
HOW OFTEN DO YOU HAVE A DRINK CONTAINING ALCOHOL: NEVER
HOW MANY STANDARD DRINKS CONTAINING ALCOHOL DO YOU HAVE ON A TYPICAL DAY: PATIENT DOES NOT DRINK
AUDIT-C TOTAL SCORE: 0

## 2023-10-23 ASSESSMENT — PAIN - FUNCTIONAL ASSESSMENT
PAIN_FUNCTIONAL_ASSESSMENT: 0-10
PAIN_FUNCTIONAL_ASSESSMENT: 0-10

## 2023-10-23 ASSESSMENT — ACTIVITIES OF DAILY LIVING (ADL): LACK_OF_TRANSPORTATION: NO

## 2023-10-23 NOTE — CARE PLAN
The patient's goals for the shift include  no bleeding and U/S results WNL.  The clinical goals for the shift include monitor bleeding    Over the shift, the patient did not make progress toward the following goals. Barriers to progression include ***. Recommendations to address these barriers include ***.

## 2023-10-23 NOTE — CARE PLAN
The patient's goals for the shift include  to remain pregnant    The clinical goals for the shift include  not to deliver

## 2023-10-23 NOTE — NURSING NOTE
Pt discharged from triage and taken via ambulance transport to [facility]. IV in right AC WDL, patent, no signs of infiltration/infection, flushed, and saline locked at time of transfer of care.

## 2023-10-23 NOTE — PROGRESS NOTES
Antepartum Progress Note    Assessment/Plan   Pauline Sun is a 38 y.o.  at 23w6d. GHASSAN: 2024, by Last Menstrual Period, admitted with vaginal bleeding and c/f abruption, admitted to [facility] and now on L&D for prolonged monitoring in s/o deep variable decelerations on am NST     Vaginal bleeding  - Patient with two episodes of light vaginal bleeding with associated cramping and spotting. Subchorionic hematoma earlier this pregnancy, resolved.  - Vitals wnl, labs notable for hgb 12.3 > 10.0 > 9.4 > 9.6 now stabilized in 9s. coags/fibrinogen wnl, however fibrinogen down trending 549 ->383, am labs pending  - SVE 0/0/-3, SSE on admission with friable area on posterior cervix with scant bloody discharge in posterior fornix. No active bleeding from cervical os, no vaginal bleeding overnight or this am    - BSUS with normal growth (60.8%), anterior placenta without retroplacental hematoma, normal placental thickness (4cm), no previa, normal fluid, for formal SSUS this AM  - Differential includes placental abruption, cervicitis,  labor. At this time most likely diagnosis is placental abruption given associated contractions and amount of bleeding.   - Given area of friability on posterior cervix, GC/CT, wet prep/trich, HSV swabs sent, pending      Hypothyroidism  - Continue home levothyroxine     IUP  - Daily NST while admitted, on  L&D for monitoring as above  - Breech presentation (10/22)  - Last SVE 0/0/-3 (10/22)  - BMZ#1 given 10/22, for second dose today  - GBS pending 10/22  - Bedside ultrasound 10/22: EFW 715g (60.8%), AC 83%. Anterior placenta no retroplacental hematoma, placenta thickness ~4cm, normal fluid  - for NICU CS this AM  - Magnesium deferred overnight given clinical stability, patient continuing to be comfortable this am, some contractions overnight that have since resolved   - Discussed possible need for classical cs if moving towards delivery, patient desires CS for fetal  indications       Principal Problem:    Vaginal bleeding    Pregnancy Problems (from 23 to present)       Problem Noted Resolved    22 weeks gestation of pregnancy 10/16/2023 by ARLEY Weiner No    Priority:  Medium      Multigravida of advanced maternal age in second trimester 10/16/2023 by ARLEY Weiner No    Priority:  Medium      Overview Signed 10/16/2023 12:36 PM by ARLEY Weiner     Growth scans 30, 36 weeks  Weekly  testing @ 34 weeks  bASA 162mg daily                 Subjective   Denies recurrence of vaginal bleeding, comfortable, not feeling ctx     Objective   Allergies:   Patient has no known allergies.    Last Vitals:  Temp Pulse Resp BP MAP Pulse Ox   36.5 °C (97.7 °F) 72 18 101/60   96 %     Vitals Min/Max Last 24 Hours:  Temp  Min: 36.4 °C (97.5 °F)  Max: 36.9 °C (98.4 °F)  Pulse  Min: 66  Max: 91  Resp  Min: 16  Max: 20  BP  Min: 91/54  Max: 109/63    Intake/Output:   No intake or output data in the 24 hours ending 10/23/23 0829    Physical Exam:  GENERAL: Examination reveals a well developed, well nourished, gravid female in no acute distress. She is alert and cooperative.  HEENT: PERRLA. External ears normal. Nose normal, no erythema or discharge. Mouth and throat clear.  ABDOMEN: soft, gravid, nontender, nondistended, no abnormal masses, no epigastric pain  FHR is 145  , with accels, Variable decelerations, and an AGA tracing.    [place] reading:  irritability   EXTREMITIES: no redness or tenderness in the calves or thighs, no edema  SKIN: normal coloration and turgor, no rashes  NEUROLOGICAL: alert, oriented, normal speech, no focal findings or movement disorder noted  PSYCHOLOGICAL: awake and alert; oriented to person, place, and time    Lab Data:  Labs in chart were reviewed.

## 2023-10-23 NOTE — H&P
Obstetrical Admission History and Physical    Assessment/Plan    Pauline Sun is a 38 y.o.  at 23w5d by IUI dating admitted for vaginal bleeding in pregnancy    Vaginal bleeding  - Patient with two episodes of light vaginal bleeding with associated cramping and spotting. Subchorionic hematoma earlier this pregnancy, resolved.  - Vitals wnl, labs notable for hgb 10.0 (from 12.3 previously), coags/fibrinogen wnl  - SVE 0/0/-3, SSE with friable area on posterior cervix with scant bloody discharge in posterior fornix. No active bleeding from cervical os  - BSUS with normal growth (60.8%), anterior placenta without retroplacental hematoma, normal placental thickness (4cm), no previa, normal fluid  - Fetal monitoring reassuring, toco with regular non-painful contractions  - Differential includes placental abruption, cervicitis,  labor. At this time most likely diagnosis is placental abruption given associated contractions and amount of bleeding.   - Given area of friability on posterior cervix, GC/CT, wet prep/trich, HSV swabs sent  - For MFM consult in AM    Hypothyroidism  - Continue home levothyroxine    IUP  - CEFM on L&D, Cat I  - For daily NSTs while admitted  - Breech presentation (10/22)  - Last SVE 0/0/-3  - BMZ#1 given 10/22, for second dose in 24 hours  - GBS pending 10/22  - Bedside ultrasound 10/22: EFW 715g (60.8%), AC 83%. Anterior placenta no retroplacental hematoma, placenta thickness ~4cm, normal fluid  - NICU consult if moving toward delivery  - Magnesium deferred given clinical stability    Medical Problems       Problem List       * (Principal) Vaginal bleeding        Breast nodule        Hypothyroidism        Intermittent constipation        Subchorionic hematoma    Overweight    22 weeks gestation of pregnancy    Primary female infertility    Overview Signed 10/16/2023 12:36 PM by AVA Weiner-MAEGAN     Conceived on Letrozole  IUI         Multigravida of advanced maternal  age in second trimester    Overview Signed 10/16/2023 12:36 PM by Tabitha Vazquez, AVA-MAEGAN     Growth scans 30, 36 weeks  Weekly  testing @ 34 weeks  bASA 162mg daily               Subjective   Patient reports that on 10/21 she had 5 hours of light vaginal bleeding, more than spotting, which resolved over the course of the day. Only noticed in when she wiped. Earlier today, she had resurgence of bleeding with associated regular contractions, which prompted her to present to [facility]. Bleeding has since resolved again, and she currently denies painful contractions. Denies LOF. Reports feeling fetal movement.     Denies recent intercourse. Denies history of STIs. States that she had a subchorionic hematoma in her first trimester which resolved.     Obstetrical History   OB History    Para Term  AB Living   1 0 0 0 0 0   SAB IAB Ectopic Multiple Live Births   0 0 0 0 0      # Outcome Date GA Lbr Donell/2nd Weight Sex Delivery Anes PTL Lv   1 Current               Obstetric Comments   AMA, 38 years old.  2023 03:51 PM - PU        NSTs at 36 weeks   2023 11:03 AM - LZ [id number] false        growth 30, 36 weeks   2023 11:02 AM - LZ [id number] false        sp RR cfDNA - BOY   2023 10:52 AM - LZ       A1C 2022 5.6%   2023 10:51 AM - LZ     BLOOD TYPE IS A+  2023 01:24 PM - MP     Clomid, Letrozole and IUI.  2023 03:52 PM - PU     Foresight negative  07/10/2023 12:47 PM - RS     Hashimoto's on Levo 100mcg qd         Past Medical History  Past Medical History:   Diagnosis Date    Encounter for fertility testing 2022    Fertility testing    Hirsutism     Personal history of other diseases of the respiratory system     Personal history of asthma    Personal history of other diseases of the respiratory system     History of asthma    Personal history of other endocrine, nutritional and metabolic disease 2022    History of hypothyroidism    Personal history  of other endocrine, nutritional and metabolic disease 2022    History of hypothyroidism    Varicella without complication     Varicella without complication        Past Surgical History   Past Surgical History:   Procedure Laterality Date    BREAST BIOPSY      POLYPECTOMY         Social History  Social History     Tobacco Use    Smoking status: Former     Types: Cigarettes     Quit date:      Years since quittin.8    Smokeless tobacco: Not on file   Substance Use Topics    Alcohol use: Not Currently     Substance and Sexual Activity   Drug Use Never       Allergies  Patient has no known allergies.     Medications  Medications Prior to Admission   Medication Sig Dispense Refill Last Dose    levothyroxine (Synthroid, Levoxyl) 100 mcg tablet Take 1 tablet (100 mcg) by mouth once daily in the morning. Take before meals.          Objective    Last Vitals  Temp Pulse Resp BP MAP O2 Sat   36.7 °C (98.1 °F) 76 18 101/64   100 %     Physical Examination  General: no acute distress  HEENT: normocephalic, atraumatic  Heart: warm and well perfused  Lungs: breathing comfortably on room air  Abdomen: gravid, nontender to palpation  Extremities: moving all extremities  Neuro: awake and conversant  Psych: appropriate mood and affect    SVE: 0/0/-3  SSE: cervix visually closed. No vaginal lacerations visualized. Cervix without active bleeding. 3mL of dark red mucous in posterior fornix. 0.5cm area of friability on posterior cervix, bleeds when touched.     Baseline Fetal Heart Rate (bpm): 140 bpm  Baseline Classification: Normal  Variability: Moderate (Between 6 and 25 BPM)  Pattern: Accelerations  FHR Category: Category I  Peters: q2-3min    Lab Review  Lab Results   Component Value Date    WBC 8.2 10/22/2023    HGB 9.5 (L) 10/22/2023    HCT 30.2 (L) 10/22/2023     10/22/2023     Lab Results   Component Value Date    APTT 27 10/22/2023    PROTIME 11.1 10/22/2023    INR 1.0 10/22/2023     Lab Results    Component Value Date    FIBRINOGEN 383 10/22/2023

## 2023-10-23 NOTE — CARE PLAN
The patient's goals for the shift include  less bleeding and no contractions    The clinical goals for the shift include monitor bleeding and contraction pattern    Over the shift, the patient did not make progress toward the following goals. Barriers to progression include n/a. Recommendations to address these barriers include n/a.

## 2023-10-23 NOTE — CONSULTS
Obstetrical Consult    Reason for Consult: vaginal bleeding    Assessment/Plan    Pauline Sun is a 38 y.o.  at 23w6d, admitted for vaginal bleeding.     Vaginal bleeding  - Patient with two episodes of light vaginal bleeding with associated cramping and spotting. Subchorionic hematoma earlier this pregnancy, resolved.  - Vitals wnl, labs notable for hgb 10.0 (from 12.3 previously), coags/fibrinogen wnl, however fibrinogen down trending 549 ->383, am labs pending  - SVE 0/0/-3, SSE on admission with friable area on posterior cervix with scant bloody discharge in posterior fornix. No active bleeding from cervical os   - BSUS with normal growth (60.8%), anterior placenta without retroplacental hematoma, normal placental thickness (4cm), no previa, normal fluid, for formal SSUS this AM  - To L&D for prolonged monitoring this AM due to two deep variable decelerations to 60s, pt now feeling mild ctx  - Differential includes placental abruption, cervicitis,  labor. At this time most likely diagnosis is placental abruption given associated contractions and amount of bleeding.   - Given area of friability on posterior cervix, GC/CT, wet prep/trich, HSV swabs sent     Hypothyroidism  - Continue home levothyroxine     IUP  - Daily NST while admitted, to L&D for monitoring as above  - Breech presentation (10/22)  - Last SVE 0/0/-3 (10/22)  - BMZ#1 given 10/22, for second dose today  - GBS pending 10/22  - Bedside ultrasound 10/22: EFW 715g (60.8%), AC 83%. Anterior placenta no retroplacental hematoma, placenta thickness ~4cm, normal fluid  - for NICU CS this AM  - Magnesium deferred overnight given clinical stability -> given increase in ctx, can consider Mg for fetal neuroprotection if ctx persist on L&D  - Discussed possible need for classical cs if moving towards delivery, patient desires CS for fetal indications    To be discussed with Dr. Jazmin Deng MD  PGY-2, Obstetrics and  Gynecology  Maternal Fetal Medicine, pager: [phone]      Subjective   Patient feeling midlly uncomfortable ctx every few min, for past 20min. Denies any additional bleeding overnight, no LOF. Feeling good FM.     Obstetrical History   OB History    Para Term  AB Living   1 0 0 0 0 0   SAB IAB Ectopic Multiple Live Births   0 0 0 0 0      # Outcome Date GA Lbr Donell/2nd Weight Sex Delivery Anes PTL Lv   1 Current               Obstetric Comments   AMA, 38 years old.  2023 03:51 PM - PU        NSTs at 36 weeks   2023 11:03 AM - LZ [id number] false        growth 30, 36 weeks   2023 11:02 AM - LZ [id number] false        sp RR cfDNA - BOY   2023 10:52 AM - LZ       A1C 2022 5.6%   2023 10:51 AM - LZ     BLOOD TYPE IS A+  2023 01:24 PM - MP     Clomid, Letrozole and IUI.  2023 03:52 PM - PU     Foresight negative  07/10/2023 12:47 PM - RS     Hashimoto's on Levo 100mcg qd         Past Medical History  Past Medical History:   Diagnosis Date    Encounter for fertility testing 2022    Fertility testing    Hirsutism     Personal history of other diseases of the respiratory system     Personal history of asthma    Personal history of other diseases of the respiratory system     History of asthma    Personal history of other endocrine, nutritional and metabolic disease 2022    History of hypothyroidism    Personal history of other endocrine, nutritional and metabolic disease 2022    History of hypothyroidism    Varicella without complication     Varicella without complication        Past Surgical History   Past Surgical History:   Procedure Laterality Date    BREAST BIOPSY      POLYPECTOMY         Social History  Social History     Tobacco Use    Smoking status: Former     Types: Cigarettes     Quit date:      Years since quittin.8    Smokeless tobacco: Not on file   Substance Use Topics    Alcohol use: Not Currently     Substance and Sexual  Activity   Drug Use Never       Allergies  Patient has no known allergies.     Medications  Medications Prior to Admission   Medication Sig Dispense Refill Last Dose    levothyroxine (Synthroid, Levoxyl) 100 mcg tablet Take 1 tablet (100 mcg) by mouth once daily in the morning. Take before meals.          Objective    Last Vitals  Temp Pulse Resp BP MAP O2 Sat   36.6 °C (97.9 °F) 80 18 101/60   99 %     Physical Examination  General: Well appearing, alert  HEENT: normocephalic, EOMI, clear sclera  Cardio: Warm and well perfused  Resp: breathing comfortably on room air  Abd: gravid, nontender  Neuro: grossly intact, no focal deficits  Extremities: full ROM, no calf tenderness  Psych: A&O x3, appropriate mood and affect      Lab Review  Lab Results   Component Value Date    ABO A 10/22/2023    LABRH POS 10/22/2023    ABSCRN NEG 10/22/2023     Lab Results   Component Value Date    WBC 7.7 10/23/2023    HGB 9.6 (L) 10/23/2023    HCT 30.0 (L) 10/23/2023     10/23/2023     Lab Results   Component Value Date    APTT 27 10/22/2023    PROTIME 11.1 10/22/2023    INR 1.0 10/22/2023     Lab Results   Component Value Date    FIBRINOGEN 383 10/22/2023

## 2023-10-23 NOTE — INDIVIDUALIZED OVERALL PLAN OF CARE NOTE
Pt reports contractions resolved. Denies continued vaginal bleeding. Reports good fetal movement. CEFM: Cat I    Ok for transfer to antepartum.    Discussed with Dr. Aparna Early MD  Labor and Delivery

## 2023-10-24 ENCOUNTER — HOSPITAL ENCOUNTER (INPATIENT)
Facility: HOSPITAL | Age: 38
End: 2023-10-24
Attending: OBSTETRICS & GYNECOLOGY | Admitting: OBSTETRICS & GYNECOLOGY
Payer: COMMERCIAL

## 2023-10-24 VITALS
HEIGHT: 64 IN | SYSTOLIC BLOOD PRESSURE: 90 MMHG | OXYGEN SATURATION: 98 % | HEART RATE: 69 BPM | DIASTOLIC BLOOD PRESSURE: 56 MMHG | BODY MASS INDEX: 28.98 KG/M2 | WEIGHT: 169.75 LBS | RESPIRATION RATE: 16 BRPM | TEMPERATURE: 98.1 F

## 2023-10-24 LAB — GP B STREP GENITAL QL CULT: NORMAL

## 2023-10-24 PROCEDURE — 99221 1ST HOSP IP/OBS SF/LOW 40: CPT | Performed by: PEDIATRICS

## 2023-10-24 PROCEDURE — 59025 FETAL NON-STRESS TEST: CPT | Mod: GC

## 2023-10-24 PROCEDURE — 2500000001 HC RX 250 WO HCPCS SELF ADMINISTERED DRUGS (ALT 637 FOR MEDICARE OP)

## 2023-10-24 PROCEDURE — 59025 FETAL NON-STRESS TEST: CPT

## 2023-10-24 PROCEDURE — 99238 HOSP IP/OBS DSCHRG MGMT 30/<: CPT

## 2023-10-24 RX ADMIN — LEVOTHYROXINE SODIUM 100 MCG: 100 TABLET ORAL at 09:12

## 2023-10-24 RX ADMIN — ASPIRIN 81 MG CHEWABLE TABLET 162 MG: 81 TABLET CHEWABLE at 09:11

## 2023-10-24 ASSESSMENT — PAIN SCALES - GENERAL: PAINLEVEL_OUTOF10: 0 - NO PAIN

## 2023-10-24 ASSESSMENT — PAIN - FUNCTIONAL ASSESSMENT: PAIN_FUNCTIONAL_ASSESSMENT: 0-10

## 2023-10-24 NOTE — HOSPITAL COURSE
Patient was admitted on 10/22 as transfer from [facility] for c/f abruption given vaginal bleeding over multiple days and light cramping. Cervix remained closed. Her hgb, coags, and fibrinogen remained wnl and stable over multiple sets. She received BMZ 10/22-23. Growth US was wnl without evidence of abruption. By hospital day 3, her bleeding and cramping had resolved. Suspect vaginal bleeding due to small/chronic abruption, now resolving. Patient was discharged on hospital day 3 with close OB follow up to be scheduled with Dr. Palumbo next week.

## 2023-10-24 NOTE — CARE PLAN
The patient's goals for the shift include dicharge    The clinical goals for the shift include discharge education    Patient was discharged with no s/sx of abruption, no vaginal bleeding, no OB complaints at this time. VSS throughout shift, patient remained free from falls and injury through discharge as well. Patient feels ready to go home.

## 2023-10-24 NOTE — DISCHARGE SUMMARY
Discharge Summary    Admission Date: 10/22/2023  Discharge Date: 10/24/23    Discharge Diagnosis  Vaginal bleeding    Hospital Course  Patient was admitted on 10/22 as transfer from [facility] for c/f abruption given vaginal bleeding over multiple days and light cramping. Cervix remained closed. Her hgb, coags, and fibrinogen remained wnl and stable over multiple sets. She received BMZ 10/22-23. Growth US was wnl without evidence of abruption. By hospital day 3, her bleeding and cramping had resolved. Suspect vaginal bleeding due to small/chronic abruption, now resolving. Patient was discharged on hospital day 3 with close OB follow up to be scheduled with Dr. Palumbo next week.       Pertinent Physical Exam At Time of Discharge  General: Well appearing, alert  HEENT: normocephalic, EOMI, clear sclera  Cardio: Warm and well perfused  Resp: breathing comfortably on room air  Abd: gravid, nontender  Neuro: grossly intact, no focal deficits  Extremities: full ROM, no calf tenderness  Psych: A&O x3, appropriate mood and affect    Fetal Assessment  FHT: 145/mod/ADA  [place]: quiet    Discharge Meds     Your medication list        ASK your doctor about these medications        Instructions Last Dose Given Next Dose Due   levothyroxine 100 mcg tablet  Commonly known as: Synthroid, Levoxyl                     Test Results Pending At Discharge  Pending Labs       No current pending labs.            Outpatient Follow-Up  Future Appointments   Date Time Provider Department Center   11/13/2023  9:15 AM MD SOF[facility]   12/4/2023  7:45 AM MAC [id number] OBGYNIMG ULTRASOUND 2 [id number] MAC [facility]   12/11/2023 10:30 AM MD SOFIA [facility]   12/27/2023  4:00 PM MD SO[facility]   1/8/2024 10:00 AM MD SOF[facility]   1/15/2024  9:00 AM MD SO[facility]   1/22/2024  9:00 AM MD [facility]   1/29/2024  9:00 AM MD SOF[facility]   2/5/2024   9:00 AM Lucrecia Palumbo MD [id number] [facility]   2/12/2024  9:15 AM Lucrecia Palumbo MD [id number] [facility]           Roxane Deng MD

## 2023-10-24 NOTE — CONSULTS
"NICU PRENATAL CONSULT NOTE   Pauline Sun is a 38 y.o.  with GHASSAN 2024, by Last Menstrual Period presenting with vaginal bleeding and concern for abruption.  There was no evidence of abruption on ultrasound, bleeding has resolved and likely diagnosis is small/chronic abruption now resolving.  She has had prolonged monitoring for some decelerations on 10/22.  Received betamethasone x 2.  Estimated fetal weight on 10/22 (23 6/7) is 682g.    Requesting Physician/Service:  Jazmin/JOSE L  Consulting Physician/Service: Elder/Neonatology    Reason for Consultation: Vaginal bleeding/concern for abruption at 24 weeks gestation    Pregnancy Complications: see above    Prenatal labs:   Lab Results   Component Value Date    ABO A 10/22/2023    LABRH POS 10/22/2023    ABSCRN NEG 10/22/2023    RUBIG POSITIVE 2023     Toxicology:   No results found for: \"AMPHETAMINE\", \"MAMPHBLDS\", \"BARBITURATE\", \"BARBSCRNUR\", \"BENZODIAZ\", \"BENZO\", \"BUPRENBLDS\", \"CANNABBLDS\", \"CANNABINOID\", \"COCBLDS\", \"COCAI\", \"METHABLDS\", \"METH\", \"OXYBLDS\", \"OXYCODONE\", \"PCPBLDS\", \"PCP\", \"OPIATBLDS\", \"OPIATE\", \"FENTANYL\", \"DRBLDCOMM\"  Labs:  Lab Results   Component Value Date    GRPBSTREP Culture in progress 10/22/2023    HIV1X2 NONREACTIVE 2023    HEPBSAG NONREACTIVE 2023    HEPCAB NONREACTIVE 2023    NEISSGONOAMP NEGATIVE 2023    CHLAMTRACAMP NEGATIVE 2023    SYPHT NONREACTIVE 2023     Fetal Imaging:  Exam date        GA              BPD (mm)          HC (mm)              AC (mm)               FL (mm)             HL (mm)          EFW (g)  2023        19w 1d        44.3     62%        163.4    40%        150.8     82%        29.4    40%        32     90%        305    74%  10/23/2023        23w 6d        61.5     82%        217.6    30%        193.7     48%        43.5    51%                               682    62%  Impression  =========     INPATIENT STUDY:     Patient currently admitted for " vaginal bleeding, presumed abruption. Pregnancy complicated by AMA, thyroid disease, and IVF conception. She had a subchorionic  hemorrhage early in pregnancy.     - Normal interval growth.  - Normal limited anatomic assessment.  - No sonographic evidence of abruption.  - Normal fluid  - Active fetus    Medical History  She has a past medical history of Encounter for fertility testing (09/26/2022), Hirsutism, Personal history of other diseases of the respiratory system, Personal history of other diseases of the respiratory system, Personal history of other endocrine, nutritional and metabolic disease (09/26/2022), Personal history of other endocrine, nutritional and metabolic disease (09/26/2022), and Varicella without complication.     Family History  Family History   Problem Relation Name Age of Onset    Colon cancer Father      Other (CARDIAC DISORDER) Maternal Grandmother      Diabetes Maternal Grandmother      Hypertension Maternal Grandmother      Heart attack Maternal Grandmother      Hyperlipidemia Maternal Grandmother          Social History  She reports that she quit smoking about 6 years ago. Her smoking use included cigarettes. She does not have any smokeless tobacco history on file. She reports that she does not currently use alcohol. She reports that she does not use drugs.      Assessment/Recommendations:  Pauline is a 39 yo G 1 P 0 currently at 24 weeks gestation.  She presented with vaginal bleeding which has resolved.  She has received betamethasone x 2.         I discussed the predicted survival (63% per the NICHD calculator) and neurodevelopment impairment (32-37%) of an infant at 24 weeks gestation.  I explained resuscitation teams’ presence at delivery and our plan of care.    2. Discussed possible short term morbidities including:   RDS, possible intubation, surfactant.   IVH   Infection.   Need for vascular access and obtained verbal permission.        Benefits of breast feeding and our  feeding protocols   Duration of hospitalization and milestones which need to be met for discharge readiness.    3. I strongly encouraged the mother to provide breast milk to the infant.    4.         Discussed with parents plan may need to be modified after the baby has been born and examined.      Patient made aware that Neonatology will be present for delivery and that we remain available for any questions/concerns that might arise. Thank you.     Electronically signed by:  Alea Friedman MD     I spent 45 minutes for the consult with at least half the time being face to face with the patient.

## 2023-10-25 ENCOUNTER — PATIENT OUTREACH (OUTPATIENT)
Dept: CARE COORDINATION | Facility: CLINIC | Age: 38
End: 2023-10-25
Payer: COMMERCIAL

## 2023-10-25 LAB
BLOOD EXPIRATION DATE: NORMAL
BLOOD EXPIRATION DATE: NORMAL
DISPENSE STATUS: NORMAL
DISPENSE STATUS: NORMAL
PRODUCT BLOOD TYPE: 6200
PRODUCT BLOOD TYPE: 6200
PRODUCT CODE: NORMAL
PRODUCT CODE: NORMAL
UNIT ABO: NORMAL
UNIT ABO: NORMAL
UNIT NUMBER: NORMAL
UNIT NUMBER: NORMAL
UNIT RH: NORMAL
UNIT RH: NORMAL
UNIT VOLUME: 350
UNIT VOLUME: 350
XM INTEP: NORMAL
XM INTEP: NORMAL

## 2023-10-25 NOTE — PROGRESS NOTES
10/25/2023  Outreach call to patient to support a smooth transition of care from recent admission.  Left voicemail message for patient with my contact information.  Enrolled patient in [employer] chatbot for additional support and patient education through transition period.  Will continue to monitor through transition period.  yanira

## 2023-10-25 NOTE — SIGNIFICANT EVENT
10/25/23 120   Follow Up Phone Call   Do you have questions about your home instructions? Yes   Did the nurse use a  to talk to you about your discharge instructions? Not applicable   Were you able to fill all of your prescriptions? Not applicable   Do you have questions about your medication instructions? Not applicable   Do you know your follow up appointments? Yes   If you had home services arranged have they begun? Not applicable   If you had equipment ordered for home, did it arrive? Not applicable   Do you know who to call with worsening symptoms? Yes     Follow-up Phone Call Note:   Interview:  Care Type: Women's Health   Phone Number Call  [phone]   Call Outcome: Connected with patient   Patient Reports Feeling (symptoms) Are: better   Patient Has a Primary Care Provider:     Yes   Post-hospitalization Follow-up Occurred According To Schedule:    No   Reason: appointment scheduled in future   Delivered Baby(ies): No   Call Back Done By: care coordinator   Callback Complete:  Yes

## 2023-10-29 PROBLEM — O45.92 PLACENTAL ABRUPTION IN SECOND TRIMESTER (HHS-HCC): Status: ACTIVE | Noted: 2023-10-29

## 2023-10-29 PROBLEM — Z3A.24 24 WEEKS GESTATION OF PREGNANCY (HHS-HCC): Status: ACTIVE | Noted: 2023-10-16

## 2023-10-29 NOTE — PROGRESS NOTES
Pauline Sun is a 38 y.o. at 24w6d here for F/U prenatal visit with MFM, normally follows with [facility], but could not get in for F/U this week    Her pregnancy is complicated by:   AMA  Presumed placental abruption    Overall she reports feeling well. Feeling +FM. Denies VB, LOF, or CTXs.     Concerns today: no new concerns. Denies bleeding or cramping since hospitalization    Gen-NAD  Cardiac- good peripheral perfusion  Respiratory- non-labored breathing  Abdomen- soft, non-tender, gravid   Extremities- symmetrical   Pelvic- deferred         Problem List Items Addressed This Visit          Pregnancy    24 weeks gestation of pregnancy - Primary    Overview     -Recent hospitalization with close F/U for presumed placental abruption (see dx for more details)  -Prenatal care utd  -Desires delivery at [facility]   -Hgb 10.0 while inpatient, discussed starting FeSO4 1 tab every other day   -Desires flu vaccine, left before giving today, will plan to get at next PNV if she has not received it yet   -Third trimester labs around 28 wks - recheck CBC with anemia panel   -PNV x2 wks with Dr. Palumbo         Hypothyroidism    Overview     -Last TSH  wnl   -On synthroid 100mcg daily   -Plan to repeat labs with next prenatal visit          Multigravida of advanced maternal age in second trimester    Overview     Growth scans 30, 36 weeks  Weekly  testing @ 36 weeks  bASA 162mg daily         Placental abruption in second trimester    Overview     -Recent hospitalization for presumed placental abruption based on uterine contractions with bleeding. Pt's bleeding and contractions resolved and ultimately discharged home with plan for close F/U   -Recommend serial third trimester growth- scheduled for 30 wks, discussed this is reasonable.    -Bleeding precautions reviewed; pt aware to return to triage for bleeding especially when accompanied by contractions, decreased fetal movement, or other concerns  -Reviewed that for  additional episodes of bleeding, the above plan could change - pt verbalized understanding and agreeable to plan            Other Visit Diagnoses       Anemia of mother in pregnancy, delivered with postpartum condition        Relevant Medications    ferrous sulfate 324 mg (65 mg elemental iron) EC tablet (delayed release)               -PNV x 2 wks with Dr. Palumbo  -Will get flu vaccine at that visit and labs - CBC with anemia panel, 1hr gtt, syphilis, and Vit D.   -Discussed tdap, RSV and Covid vaccines as well and recommendations for immunization and when in her pregnancy      Frida Britton, AVA-CNP

## 2023-10-30 ENCOUNTER — ROUTINE PRENATAL (OUTPATIENT)
Dept: MATERNAL FETAL MEDICINE | Facility: HOSPITAL | Age: 38
End: 2023-10-30
Payer: COMMERCIAL

## 2023-10-30 VITALS — DIASTOLIC BLOOD PRESSURE: 69 MMHG | SYSTOLIC BLOOD PRESSURE: 105 MMHG | WEIGHT: 167 LBS | BODY MASS INDEX: 28.67 KG/M2

## 2023-10-30 DIAGNOSIS — O45.92 PLACENTAL ABRUPTION IN SECOND TRIMESTER (HHS-HCC): ICD-10-CM

## 2023-10-30 DIAGNOSIS — E03.8 OTHER SPECIFIED HYPOTHYROIDISM: ICD-10-CM

## 2023-10-30 DIAGNOSIS — O09.522 MULTIGRAVIDA OF ADVANCED MATERNAL AGE IN SECOND TRIMESTER (HHS-HCC): ICD-10-CM

## 2023-10-30 DIAGNOSIS — Z3A.24 24 WEEKS GESTATION OF PREGNANCY (HHS-HCC): Primary | ICD-10-CM

## 2023-10-30 PROCEDURE — 99212 OFFICE O/P EST SF 10 MIN: CPT | Performed by: NURSE PRACTITIONER

## 2023-10-30 RX ORDER — FERROUS SULFATE 324(65)MG
65 TABLET, DELAYED RELEASE (ENTERIC COATED) ORAL EVERY OTHER DAY
Qty: 45 TABLET | Refills: 3 | Status: SHIPPED | OUTPATIENT
Start: 2023-10-30 | End: 2024-01-15 | Stop reason: SDUPTHER

## 2023-10-30 RX ORDER — NAPROXEN SODIUM 220 MG/1
81 TABLET, FILM COATED ORAL DAILY
COMMUNITY
End: 2024-02-10 | Stop reason: HOSPADM

## 2023-10-30 RX ORDER — MAGNESIUM L-LACTATE 84 MG
84 TABLET, EXTENDED RELEASE ORAL DAILY
COMMUNITY
End: 2024-06-04 | Stop reason: WASHOUT

## 2023-10-31 DIAGNOSIS — R79.89 OTHER SPECIFIED ABNORMAL FINDINGS OF BLOOD CHEMISTRY: ICD-10-CM

## 2023-10-31 RX ORDER — LEVOTHYROXINE SODIUM 100 UG/1
100 TABLET ORAL DAILY
Qty: 90 TABLET | Refills: 1 | Status: SHIPPED | OUTPATIENT
Start: 2023-10-31 | End: 2023-11-13 | Stop reason: SDUPTHER

## 2023-11-09 ENCOUNTER — DOCUMENTATION (OUTPATIENT)
Dept: CARE COORDINATION | Facility: CLINIC | Age: 38
End: 2023-11-09
Payer: COMMERCIAL

## 2023-11-09 NOTE — PROGRESS NOTES
11/09/2023   Attempted outreach call to patient following up on appointment with her doctor.    Left a voice  message with my contact information.   Will continue to follow.   yanira

## 2023-11-13 ENCOUNTER — ROUTINE PRENATAL (OUTPATIENT)
Dept: OBSTETRICS AND GYNECOLOGY | Facility: CLINIC | Age: 38
End: 2023-11-13
Payer: COMMERCIAL

## 2023-11-13 ENCOUNTER — LAB (OUTPATIENT)
Dept: LAB | Facility: LAB | Age: 38
End: 2023-11-13
Payer: COMMERCIAL

## 2023-11-13 VITALS — DIASTOLIC BLOOD PRESSURE: 71 MMHG | BODY MASS INDEX: 29.56 KG/M2 | WEIGHT: 172.2 LBS | SYSTOLIC BLOOD PRESSURE: 112 MMHG

## 2023-11-13 DIAGNOSIS — E06.3 HYPOTHYROIDISM DUE TO HASHIMOTO'S THYROIDITIS: ICD-10-CM

## 2023-11-13 DIAGNOSIS — E06.3 HYPOTHYROIDISM DUE TO HASHIMOTO'S THYROIDITIS: Primary | ICD-10-CM

## 2023-11-13 DIAGNOSIS — R73.09 ELEVATED GLUCOSE TOLERANCE TEST: ICD-10-CM

## 2023-11-13 DIAGNOSIS — O45.92 PLACENTAL ABRUPTION IN SECOND TRIMESTER (HHS-HCC): Primary | ICD-10-CM

## 2023-11-13 DIAGNOSIS — E03.8 HYPOTHYROIDISM DUE TO HASHIMOTO'S THYROIDITIS: ICD-10-CM

## 2023-11-13 DIAGNOSIS — O99.012 ANEMIA AFFECTING PREGNANCY IN SECOND TRIMESTER (HHS-HCC): ICD-10-CM

## 2023-11-13 DIAGNOSIS — E03.8 HYPOTHYROIDISM DUE TO HASHIMOTO'S THYROIDITIS: Primary | ICD-10-CM

## 2023-11-13 DIAGNOSIS — R79.89 OTHER SPECIFIED ABNORMAL FINDINGS OF BLOOD CHEMISTRY: ICD-10-CM

## 2023-11-13 DIAGNOSIS — O09.812 PREGNANCY RESULTING FROM ASSISTED REPRODUCTIVE TECHNOLOGY IN SECOND TRIMESTER (HHS-HCC): ICD-10-CM

## 2023-11-13 DIAGNOSIS — N89.8 VAGINAL DISCHARGE: ICD-10-CM

## 2023-11-13 DIAGNOSIS — O09.522 MULTIGRAVIDA OF ADVANCED MATERNAL AGE IN SECOND TRIMESTER (HHS-HCC): ICD-10-CM

## 2023-11-13 DIAGNOSIS — Z34.02 ENCOUNTER FOR PRENATAL CARE IN SECOND TRIMESTER OF FIRST PREGNANCY (HHS-HCC): ICD-10-CM

## 2023-11-13 PROBLEM — R45.89 MOODY: Status: RESOLVED | Noted: 2023-10-05 | Resolved: 2023-11-13

## 2023-11-13 PROBLEM — N63.0 BREAST NODULE: Status: RESOLVED | Noted: 2023-10-05 | Resolved: 2023-11-13

## 2023-11-13 PROBLEM — E66.3 OVERWEIGHT: Status: RESOLVED | Noted: 2023-10-05 | Resolved: 2023-11-13

## 2023-11-13 PROBLEM — L85.8 KERATOSIS PILARIS: Status: RESOLVED | Noted: 2023-10-05 | Resolved: 2023-11-13

## 2023-11-13 PROBLEM — L68.0 HIRSUTISM: Status: RESOLVED | Noted: 2023-10-05 | Resolved: 2023-11-13

## 2023-11-13 LAB
ERYTHROCYTE [DISTWIDTH] IN BLOOD BY AUTOMATED COUNT: 14.1 % (ref 11.5–14.5)
GLUCOSE 1H P 50 G GLC PO SERPL-MCNC: 172 MG/DL
HCT VFR BLD AUTO: 32.3 % (ref 36–46)
HGB BLD-MCNC: 10.3 G/DL (ref 12–16)
MCH RBC QN AUTO: 28.1 PG (ref 26–34)
MCHC RBC AUTO-ENTMCNC: 31.9 G/DL (ref 32–36)
MCV RBC AUTO: 88 FL (ref 80–100)
NRBC BLD-RTO: 0 /100 WBCS (ref 0–0)
PLATELET # BLD AUTO: 277 X10*3/UL (ref 150–450)
RBC # BLD AUTO: 3.66 X10*6/UL (ref 4–5.2)
T PALLIDUM AB SER QL: NONREACTIVE
T4 FREE SERPL-MCNC: 0.72 NG/DL (ref 0.61–1.12)
TSH SERPL-ACNC: 5.53 MIU/L (ref 0.44–3.98)
WBC # BLD AUTO: 8.3 X10*3/UL (ref 4.4–11.3)

## 2023-11-13 PROCEDURE — 86780 TREPONEMA PALLIDUM: CPT

## 2023-11-13 PROCEDURE — 84439 ASSAY OF FREE THYROXINE: CPT

## 2023-11-13 PROCEDURE — 0501F PRENATAL FLOW SHEET: CPT | Performed by: OBSTETRICS & GYNECOLOGY

## 2023-11-13 PROCEDURE — 84443 ASSAY THYROID STIM HORMONE: CPT

## 2023-11-13 PROCEDURE — 85027 COMPLETE CBC AUTOMATED: CPT

## 2023-11-13 PROCEDURE — 82607 VITAMIN B-12: CPT

## 2023-11-13 PROCEDURE — 87205 SMEAR GRAM STAIN: CPT | Performed by: OBSTETRICS & GYNECOLOGY

## 2023-11-13 PROCEDURE — 36415 COLL VENOUS BLD VENIPUNCTURE: CPT

## 2023-11-13 PROCEDURE — 82947 ASSAY GLUCOSE BLOOD QUANT: CPT

## 2023-11-13 PROCEDURE — 82746 ASSAY OF FOLIC ACID SERUM: CPT

## 2023-11-13 PROCEDURE — 82728 ASSAY OF FERRITIN: CPT

## 2023-11-13 PROCEDURE — 87800 DETECT AGNT MULT DNA DIREC: CPT | Performed by: OBSTETRICS & GYNECOLOGY

## 2023-11-13 PROCEDURE — 83550 IRON BINDING TEST: CPT

## 2023-11-13 RX ORDER — LEVOTHYROXINE SODIUM 125 UG/1
125 TABLET ORAL DAILY
Qty: 30 TABLET | Refills: 1 | Status: SHIPPED | OUTPATIENT
Start: 2023-11-13 | End: 2023-12-06

## 2023-11-13 NOTE — PROGRESS NOTES
Presumed placental abruption in 2nd trimester: has repeat growth at 30 weeks. Pt didn't have bleeding after discharge until just this AM - light but red. SSE today showed on blood at os, but she did have a small amt of red blood posterior to the cx. She also had a significant amount of frothy white discharge. Vaginitis swab and gc/ct/trich collected today.  Age 38: growth at 30 weeks, NSTs at 36 weeks  Elevated 1 hr 172 --> needs 3 hr glucose  Hypothyroidism: TSH pending from today, on Levo 100mcg daily  Anemia: on Feosol daily, repeat CBC improved from 9.6 --> 10.3  Sp flu vaccine. Will get COVID vaccine this weekend. RSV at 32-36 weeks. Tdap at next visit.    Lucrecia Palumbo MD

## 2023-11-14 ENCOUNTER — TELEPHONE (OUTPATIENT)
Dept: MATERNAL FETAL MEDICINE | Facility: CLINIC | Age: 38
End: 2023-11-14
Payer: COMMERCIAL

## 2023-11-14 ENCOUNTER — TELEPHONE (OUTPATIENT)
Dept: OBSTETRICS AND GYNECOLOGY | Facility: CLINIC | Age: 38
End: 2023-11-14
Payer: COMMERCIAL

## 2023-11-14 LAB
C TRACH RRNA SPEC QL NAA+PROBE: NEGATIVE
CLUE CELLS VAG LPF-#/AREA: PRESENT /[LPF]
FERRITIN SERPL-MCNC: 58 NG/ML
FOLATE SERPL-MCNC: >24 NG/ML
IRON SATN MFR SERPL: 8 %
IRON SERPL-MCNC: 39 UG/DL
N GONORRHOEA DNA SPEC QL PROBE+SIG AMP: NEGATIVE
NUGENT SCORE: 8
REFLEX ADDED, ANEMIA PANEL: NORMAL
TIBC SERPL-MCNC: 470 UG/DL
UIBC SERPL-MCNC: 431 UG/DL
VIT B12 SERPL-MCNC: 267 PG/ML
YEAST VAG WET PREP-#/AREA: ABNORMAL

## 2023-11-14 NOTE — TELEPHONE ENCOUNTER
Patient advised of elevated TSH, to increase levothyroxine to 125mcg daily per Dr. Palumbo and repeat TSH level in 4 weeks.  Patient is agreeable.

## 2023-11-14 NOTE — TELEPHONE ENCOUNTER
----- Message from Lucrecia Palumbo MD sent at 11/13/2023  9:48 PM EST -----  TSH goal is 0.3 - 3.   I would encourage Pauline to incr her Levo to 125mcg daily and we will repeat labs in 4 weeks.

## 2023-11-15 ENCOUNTER — TELEPHONE (OUTPATIENT)
Dept: OBSTETRICS AND GYNECOLOGY | Facility: CLINIC | Age: 38
End: 2023-11-15
Payer: COMMERCIAL

## 2023-11-15 DIAGNOSIS — B96.89 BACTERIAL VAGINOSIS: Primary | ICD-10-CM

## 2023-11-15 DIAGNOSIS — N76.0 BACTERIAL VAGINOSIS: Primary | ICD-10-CM

## 2023-11-15 RX ORDER — METRONIDAZOLE 500 MG/1
500 TABLET ORAL 2 TIMES DAILY
Qty: 14 TABLET | Refills: 0 | Status: SHIPPED | OUTPATIENT
Start: 2023-11-15 | End: 2023-11-22

## 2023-11-15 NOTE — TELEPHONE ENCOUNTER
Message left for the patient to call back and [employer] message sent to the patient.   ----- Message from ARLEY Weiner sent at 11/13/2023 12:46 PM EST -----  Isis -- can you please follow up with the patient to make sure she is aware of 3 hour need and also she needs a follow up for her thryoid.

## 2023-11-15 NOTE — TELEPHONE ENCOUNTER
Patient called the office back and is aware of the results and recommendation for 3 hour GTT and increasing her levothyroxine.

## 2023-11-18 ENCOUNTER — LAB (OUTPATIENT)
Dept: LAB | Facility: LAB | Age: 38
End: 2023-11-18
Payer: COMMERCIAL

## 2023-11-18 DIAGNOSIS — E03.8 HYPOTHYROIDISM DUE TO HASHIMOTO'S THYROIDITIS: ICD-10-CM

## 2023-11-18 DIAGNOSIS — R73.09 ELEVATED GLUCOSE TOLERANCE TEST: ICD-10-CM

## 2023-11-18 DIAGNOSIS — E06.3 HYPOTHYROIDISM DUE TO HASHIMOTO'S THYROIDITIS: ICD-10-CM

## 2023-11-18 LAB
GLUCOSE 1H P 100 G GLC PO SERPL-MCNC: 166 MG/DL
GLUCOSE 2H P 100 G GLC PO SERPL-MCNC: 98 MG/DL
GLUCOSE 3H P 100 G GLC PO SERPL-MCNC: 57 MG/DL
GLUCOSE P FAST SERPL-MCNC: 87 MG/DL
T4 FREE SERPL-MCNC: 0.88 NG/DL (ref 0.61–1.12)
TSH SERPL-ACNC: 5.1 MIU/L (ref 0.44–3.98)

## 2023-11-18 PROCEDURE — 36415 COLL VENOUS BLD VENIPUNCTURE: CPT

## 2023-11-18 PROCEDURE — 82952 GTT-ADDED SAMPLES: CPT

## 2023-11-18 PROCEDURE — 82951 GLUCOSE TOLERANCE TEST (GTT): CPT

## 2023-11-18 PROCEDURE — 84439 ASSAY OF FREE THYROXINE: CPT

## 2023-11-18 PROCEDURE — 84443 ASSAY THYROID STIM HORMONE: CPT

## 2023-11-18 PROCEDURE — 82950 GLUCOSE TEST: CPT

## 2023-11-30 ENCOUNTER — DOCUMENTATION (OUTPATIENT)
Dept: CARE COORDINATION | Facility: CLINIC | Age: 38
End: 2023-11-30
Payer: COMMERCIAL

## 2023-11-30 NOTE — PROGRESS NOTES
Outreach call to patient to check in 30 days after hospital discharge to support smooth transition of care.  Left a voice message with my contact information.  No additional outreach needed at this time.    yanira

## 2023-12-04 ENCOUNTER — ANCILLARY PROCEDURE (OUTPATIENT)
Dept: RADIOLOGY | Facility: CLINIC | Age: 38
End: 2023-12-04
Payer: COMMERCIAL

## 2023-12-04 DIAGNOSIS — E03.8 HYPOTHYROIDISM DUE TO HASHIMOTO'S THYROIDITIS: Primary | ICD-10-CM

## 2023-12-04 DIAGNOSIS — O36.5930 MATERNAL CARE FOR OTHER KNOWN OR SUSPECTED POOR FETAL GROWTH, THIRD TRIMESTER, NOT APPLICABLE OR UNSPECIFIED (HHS-HCC): ICD-10-CM

## 2023-12-04 DIAGNOSIS — E06.3 HYPOTHYROIDISM DUE TO HASHIMOTO'S THYROIDITIS: Primary | ICD-10-CM

## 2023-12-04 PROCEDURE — 76819 FETAL BIOPHYS PROFIL W/O NST: CPT

## 2023-12-04 PROCEDURE — 76816 OB US FOLLOW-UP PER FETUS: CPT | Performed by: OBSTETRICS & GYNECOLOGY

## 2023-12-04 PROCEDURE — 76816 OB US FOLLOW-UP PER FETUS: CPT

## 2023-12-04 PROCEDURE — 76819 FETAL BIOPHYS PROFIL W/O NST: CPT | Performed by: OBSTETRICS & GYNECOLOGY

## 2023-12-06 DIAGNOSIS — E03.8 HYPOTHYROIDISM DUE TO HASHIMOTO'S THYROIDITIS: ICD-10-CM

## 2023-12-06 DIAGNOSIS — E06.3 HYPOTHYROIDISM DUE TO HASHIMOTO'S THYROIDITIS: ICD-10-CM

## 2023-12-06 RX ORDER — LEVOTHYROXINE SODIUM 125 UG/1
125 TABLET ORAL DAILY
Qty: 90 TABLET | Refills: 1 | Status: SHIPPED | OUTPATIENT
Start: 2023-12-06 | End: 2023-12-13 | Stop reason: SDUPTHER

## 2023-12-09 ENCOUNTER — LAB (OUTPATIENT)
Dept: LAB | Facility: LAB | Age: 38
End: 2023-12-09
Payer: COMMERCIAL

## 2023-12-09 DIAGNOSIS — E03.8 HYPOTHYROIDISM DUE TO HASHIMOTO'S THYROIDITIS: ICD-10-CM

## 2023-12-09 DIAGNOSIS — E06.3 HYPOTHYROIDISM DUE TO HASHIMOTO'S THYROIDITIS: ICD-10-CM

## 2023-12-09 LAB — TSH SERPL-ACNC: 5.49 MIU/L (ref 0.44–3.98)

## 2023-12-09 PROCEDURE — 84443 ASSAY THYROID STIM HORMONE: CPT

## 2023-12-09 PROCEDURE — 84439 ASSAY OF FREE THYROXINE: CPT

## 2023-12-09 PROCEDURE — 36415 COLL VENOUS BLD VENIPUNCTURE: CPT

## 2023-12-10 LAB — T4 FREE SERPL-MCNC: 0.98 NG/DL (ref 0.78–1.48)

## 2023-12-11 ENCOUNTER — APPOINTMENT (OUTPATIENT)
Dept: OBSTETRICS AND GYNECOLOGY | Facility: CLINIC | Age: 38
End: 2023-12-11
Payer: COMMERCIAL

## 2023-12-13 DIAGNOSIS — E06.3 HYPOTHYROIDISM DUE TO HASHIMOTO'S THYROIDITIS: ICD-10-CM

## 2023-12-13 DIAGNOSIS — E03.8 HYPOTHYROIDISM DUE TO HASHIMOTO'S THYROIDITIS: ICD-10-CM

## 2023-12-13 RX ORDER — LEVOTHYROXINE SODIUM 150 UG/1
150 TABLET ORAL DAILY
Qty: 30 TABLET | Refills: 1 | Status: SHIPPED | OUTPATIENT
Start: 2023-12-13 | End: 2024-01-05

## 2023-12-14 ENCOUNTER — ROUTINE PRENATAL (OUTPATIENT)
Dept: OBSTETRICS AND GYNECOLOGY | Facility: CLINIC | Age: 38
End: 2023-12-14
Payer: COMMERCIAL

## 2023-12-14 VITALS — SYSTOLIC BLOOD PRESSURE: 102 MMHG | DIASTOLIC BLOOD PRESSURE: 68 MMHG | BODY MASS INDEX: 29.35 KG/M2 | WEIGHT: 171 LBS

## 2023-12-14 DIAGNOSIS — E03.8 HYPOTHYROIDISM DUE TO HASHIMOTO'S THYROIDITIS: ICD-10-CM

## 2023-12-14 DIAGNOSIS — O09.813 PREGNANCY RESULTING FROM ASSISTED REPRODUCTIVE TECHNOLOGY IN THIRD TRIMESTER (HHS-HCC): ICD-10-CM

## 2023-12-14 DIAGNOSIS — E06.3 HYPOTHYROIDISM DUE TO HASHIMOTO'S THYROIDITIS: ICD-10-CM

## 2023-12-14 DIAGNOSIS — O09.523 MULTIGRAVIDA OF ADVANCED MATERNAL AGE IN THIRD TRIMESTER (HHS-HCC): ICD-10-CM

## 2023-12-14 DIAGNOSIS — O45.92 PLACENTAL ABRUPTION IN SECOND TRIMESTER (HHS-HCC): ICD-10-CM

## 2023-12-14 DIAGNOSIS — O99.012 ANEMIA AFFECTING PREGNANCY IN SECOND TRIMESTER (HHS-HCC): ICD-10-CM

## 2023-12-14 PROCEDURE — 0501F PRENATAL FLOW SHEET: CPT | Performed by: OBSTETRICS & GYNECOLOGY

## 2023-12-14 PROCEDURE — 90471 IMMUNIZATION ADMIN: CPT | Performed by: OBSTETRICS & GYNECOLOGY

## 2023-12-14 PROCEDURE — 90715 TDAP VACCINE 7 YRS/> IM: CPT | Performed by: OBSTETRICS & GYNECOLOGY

## 2023-12-14 NOTE — PROGRESS NOTES
31-2 week Ob visit.   Last growth 76%ile  Needs Tdap today.   Anemia- last Hgb 10.3  URI symptoms over the weekend, resolved now.   Treated for BV last visit, no further spotting. Contractions have stopped.     Problem List Items Addressed This Visit       Hypothyroidism    Overview     -Last TSH  wnl   -On synthroid 100mcg daily   -Plan to repeat labs with next prenatal visit   23 Most recent thryoid labs normal FT4, elevated TSH. Synthroid increased to 150 mcg daily by Dr. Palumbo         Multigravida of advanced maternal age in third trimester    Overview     Growth scans 30, 36 weeks  Weekly  testing @ 36 weeks  bASA 162mg daily  RR NIPS - BOY  Growth at 29-6 weeks 76%ile         Placental abruption in second trimester    Overview     - Admitted 10/22 - 10/24 for ctx, bleeding  - Cx never changed from closed  - US 10/23 without evidence of abruption  - Serial abruption labs neg  - Received BMZ 10/22-          Anemia affecting pregnancy in second trimester    Overview     On Feosol daily for Hgb 10 during abruption admission         Relevant Orders    CBC Anemia Panel With Reflex, Pregnancy    Pregnancy resulting from assisted reproductive technology in third trimester    Overview     CLOMID, LETROZOLE AND IUI

## 2023-12-18 DIAGNOSIS — N76.0 VAGINITIS: Primary | ICD-10-CM

## 2023-12-18 NOTE — PROGRESS NOTES
Has vaginal itching and would like a self swab because her next appointment is not for a few weeks.

## 2023-12-19 ENCOUNTER — CLINICAL SUPPORT (OUTPATIENT)
Dept: OBSTETRICS AND GYNECOLOGY | Facility: CLINIC | Age: 38
End: 2023-12-19
Payer: COMMERCIAL

## 2023-12-19 ENCOUNTER — TELEPHONE (OUTPATIENT)
Dept: OBSTETRICS AND GYNECOLOGY | Facility: CLINIC | Age: 38
End: 2023-12-19

## 2023-12-19 DIAGNOSIS — N89.8 VAGINAL DISCHARGE: ICD-10-CM

## 2023-12-19 PROCEDURE — 87205 SMEAR GRAM STAIN: CPT

## 2023-12-20 LAB
CLUE CELLS VAG LPF-#/AREA: NORMAL /[LPF]
NUGENT SCORE: 1
YEAST VAG WET PREP-#/AREA: NORMAL

## 2023-12-22 ENCOUNTER — LAB (OUTPATIENT)
Dept: LAB | Facility: LAB | Age: 38
End: 2023-12-22
Payer: COMMERCIAL

## 2023-12-22 DIAGNOSIS — O99.012 ANEMIA AFFECTING PREGNANCY IN SECOND TRIMESTER (HHS-HCC): ICD-10-CM

## 2023-12-22 LAB
ERYTHROCYTE [DISTWIDTH] IN BLOOD BY AUTOMATED COUNT: 14.2 % (ref 11.5–14.5)
HCT VFR BLD AUTO: 33.2 % (ref 36–46)
HGB BLD-MCNC: 10.4 G/DL (ref 12–16)
MCH RBC QN AUTO: 27 PG (ref 26–34)
MCHC RBC AUTO-ENTMCNC: 31.3 G/DL (ref 32–36)
MCV RBC AUTO: 86 FL (ref 80–100)
NRBC BLD-RTO: 0 /100 WBCS (ref 0–0)
PLATELET # BLD AUTO: 351 X10*3/UL (ref 150–450)
RBC # BLD AUTO: 3.85 X10*6/UL (ref 4–5.2)
WBC # BLD AUTO: 10.3 X10*3/UL (ref 4.4–11.3)

## 2023-12-22 PROCEDURE — 85027 COMPLETE CBC AUTOMATED: CPT

## 2023-12-22 PROCEDURE — 82746 ASSAY OF FOLIC ACID SERUM: CPT

## 2023-12-22 PROCEDURE — 36415 COLL VENOUS BLD VENIPUNCTURE: CPT

## 2023-12-22 PROCEDURE — 82728 ASSAY OF FERRITIN: CPT

## 2023-12-22 PROCEDURE — 82607 VITAMIN B-12: CPT

## 2023-12-22 PROCEDURE — 83550 IRON BINDING TEST: CPT

## 2023-12-23 LAB
FERRITIN SERPL-MCNC: 37 NG/ML
FOLATE SERPL-MCNC: 20.4 NG/ML
IRON SATN MFR SERPL: NORMAL %
IRON SERPL-MCNC: 46 UG/DL
REFLEX ADDED, ANEMIA PANEL: NORMAL
TIBC SERPL-MCNC: NORMAL UG/DL
UIBC SERPL-MCNC: >450 UG/DL
VIT B12 SERPL-MCNC: 219 PG/ML

## 2023-12-27 ENCOUNTER — APPOINTMENT (OUTPATIENT)
Dept: OBSTETRICS AND GYNECOLOGY | Facility: CLINIC | Age: 38
End: 2023-12-27
Payer: COMMERCIAL

## 2023-12-28 ENCOUNTER — ROUTINE PRENATAL (OUTPATIENT)
Dept: OBSTETRICS AND GYNECOLOGY | Facility: CLINIC | Age: 38
End: 2023-12-28
Payer: COMMERCIAL

## 2023-12-28 ENCOUNTER — ANCILLARY PROCEDURE (OUTPATIENT)
Dept: RADIOLOGY | Facility: CLINIC | Age: 38
End: 2023-12-28
Payer: COMMERCIAL

## 2023-12-28 VITALS — DIASTOLIC BLOOD PRESSURE: 62 MMHG | WEIGHT: 177 LBS | SYSTOLIC BLOOD PRESSURE: 102 MMHG | BODY MASS INDEX: 30.38 KG/M2

## 2023-12-28 DIAGNOSIS — E06.3 HYPOTHYROIDISM DUE TO HASHIMOTO'S THYROIDITIS: ICD-10-CM

## 2023-12-28 DIAGNOSIS — O09.523 MULTIGRAVIDA OF ADVANCED MATERNAL AGE IN THIRD TRIMESTER (HHS-HCC): ICD-10-CM

## 2023-12-28 DIAGNOSIS — O45.92 PLACENTAL ABRUPTION IN SECOND TRIMESTER (HHS-HCC): ICD-10-CM

## 2023-12-28 DIAGNOSIS — O99.012 ANEMIA AFFECTING PREGNANCY IN SECOND TRIMESTER (HHS-HCC): ICD-10-CM

## 2023-12-28 DIAGNOSIS — Z32.01 PREGNANCY TEST POSITIVE (HHS-HCC): ICD-10-CM

## 2023-12-28 DIAGNOSIS — O09.813 PREGNANCY RESULTING FROM ASSISTED REPRODUCTIVE TECHNOLOGY IN THIRD TRIMESTER (HHS-HCC): ICD-10-CM

## 2023-12-28 DIAGNOSIS — E03.8 HYPOTHYROIDISM DUE TO HASHIMOTO'S THYROIDITIS: ICD-10-CM

## 2023-12-28 PROCEDURE — 76819 FETAL BIOPHYS PROFIL W/O NST: CPT | Performed by: STUDENT IN AN ORGANIZED HEALTH CARE EDUCATION/TRAINING PROGRAM

## 2023-12-28 PROCEDURE — 76816 OB US FOLLOW-UP PER FETUS: CPT

## 2023-12-28 PROCEDURE — 76816 OB US FOLLOW-UP PER FETUS: CPT | Performed by: STUDENT IN AN ORGANIZED HEALTH CARE EDUCATION/TRAINING PROGRAM

## 2023-12-28 PROCEDURE — 0501F PRENATAL FLOW SHEET: CPT | Performed by: OBSTETRICS & GYNECOLOGY

## 2023-12-28 PROCEDURE — 76819 FETAL BIOPHYS PROFIL W/O NST: CPT

## 2023-12-28 RX ORDER — ELECTROLYTES/DEXTROSE
SOLUTION, ORAL ORAL
COMMUNITY

## 2023-12-28 NOTE — PROGRESS NOTES
33.2 week Ob visit.   Had USN today- 78%ile for growth.   Some change in movement.   Some low back pain.    Anemia improving.     Problem List Items Addressed This Visit       Hypothyroidism    Overview     -Last TSH  wnl   -On synthroid 100mcg daily   -Plan to repeat labs with next prenatal visit   23 Most recent thryoid labs normal FT4, elevated TSH. Synthroid increased to 150 mcg daily by Dr. Palumbo         Multigravida of advanced maternal age in third trimester    Overview     Growth scans 30, 36 weeks  Weekly  testing @ 36 weeks  bASA 162mg daily  RR NIPS - BOY  Growth at 29-6 weeks 76%ile         Placental abruption in second trimester    Overview     - Admitted 10/22 - 10/24 for ctx, bleeding  - Cx never changed from closed  - US 10/23 without evidence of abruption  - Serial abruption labs neg  - Received BMZ 10/22-          Anemia affecting pregnancy in second trimester    Overview     On Feosol daily for Hgb 10 during abruption admission.  23 Normal iron levels, hemoglobin  improving. No bleeding.         Pregnancy resulting from assisted reproductive technology in third trimester    Overview     CLOMID, LETROZOLE AND IUI

## 2024-01-05 DIAGNOSIS — E06.3 HYPOTHYROIDISM DUE TO HASHIMOTO'S THYROIDITIS: ICD-10-CM

## 2024-01-05 DIAGNOSIS — E03.8 HYPOTHYROIDISM DUE TO HASHIMOTO'S THYROIDITIS: ICD-10-CM

## 2024-01-05 RX ORDER — LEVOTHYROXINE SODIUM 150 UG/1
150 TABLET ORAL DAILY
Qty: 90 TABLET | Refills: 1 | Status: SHIPPED | OUTPATIENT
Start: 2024-01-05 | End: 2024-06-04 | Stop reason: WASHOUT

## 2024-01-05 NOTE — TELEPHONE ENCOUNTER
Left the patient a detailed message that we need to repeat her TSH on Monday when she comes in for her appointment with Maame Galindo CNM.

## 2024-01-08 ENCOUNTER — ROUTINE PRENATAL (OUTPATIENT)
Dept: OBSTETRICS AND GYNECOLOGY | Facility: CLINIC | Age: 39
End: 2024-01-08
Payer: COMMERCIAL

## 2024-01-08 ENCOUNTER — LAB (OUTPATIENT)
Dept: LAB | Facility: LAB | Age: 39
End: 2024-01-08
Payer: COMMERCIAL

## 2024-01-08 ENCOUNTER — APPOINTMENT (OUTPATIENT)
Dept: OBSTETRICS AND GYNECOLOGY | Facility: CLINIC | Age: 39
End: 2024-01-08
Payer: COMMERCIAL

## 2024-01-08 VITALS — DIASTOLIC BLOOD PRESSURE: 71 MMHG | SYSTOLIC BLOOD PRESSURE: 112 MMHG | BODY MASS INDEX: 30.62 KG/M2 | WEIGHT: 178.4 LBS

## 2024-01-08 DIAGNOSIS — Z34.03 ENCOUNTER FOR SUPERVISION OF NORMAL FIRST PREGNANCY IN THIRD TRIMESTER (HHS-HCC): ICD-10-CM

## 2024-01-08 DIAGNOSIS — E06.3 HYPOTHYROIDISM DUE TO HASHIMOTO'S THYROIDITIS: ICD-10-CM

## 2024-01-08 DIAGNOSIS — Z3A.34 34 WEEKS GESTATION OF PREGNANCY (HHS-HCC): Primary | ICD-10-CM

## 2024-01-08 DIAGNOSIS — E03.8 HYPOTHYROIDISM DUE TO HASHIMOTO'S THYROIDITIS: ICD-10-CM

## 2024-01-08 LAB
T4 FREE SERPL-MCNC: 0.79 NG/DL (ref 0.61–1.12)
TSH SERPL-ACNC: 2.65 MIU/L (ref 0.44–3.98)

## 2024-01-08 PROCEDURE — 0501F PRENATAL FLOW SHEET: CPT | Performed by: ADVANCED PRACTICE MIDWIFE

## 2024-01-08 PROCEDURE — 36415 COLL VENOUS BLD VENIPUNCTURE: CPT

## 2024-01-08 PROCEDURE — 84443 ASSAY THYROID STIM HORMONE: CPT

## 2024-01-08 PROCEDURE — 84439 ASSAY OF FREE THYROXINE: CPT

## 2024-01-08 NOTE — PROGRESS NOTES
PLAN:  Hypothyroidism  Thyroid labs in process. Continues to tke 150 mcg daily    Multigravida of advanced maternal age in third trimester  Next us scheduled for 36 weeks  NST weekly after that    Anemia affecting pregnancy in second trimester  Continues to take iron every other day    ASSESSESMENT:  No diagnosis found.    She is here for 34w6d OB visit.  Denies cramping, leaking of fluid, or bleeding   Baby is moving well    PHYSICAL EXAM:   /71   Wt 80.9 kg (178 lb 6.4 oz)   LMP 05/09/2023   BMI 30.62 kg/m²   I have reviewed her pertinent history, lab results, medications and problem list.  See Pregnancy episode for any changes.  Well appearing, Alert & oriented  Skin warm & dry  Normal range of motion in all extremities.    Abdomen soft  nontender  Normal resp effort    Reviewed importance of FKC  GBS next visit    ARLEY Kapoor   01/08/24    Follow up in about 2 weeks (around 1/22/2024) for BOB.

## 2024-01-15 ENCOUNTER — ROUTINE PRENATAL (OUTPATIENT)
Dept: OBSTETRICS AND GYNECOLOGY | Facility: CLINIC | Age: 39
End: 2024-01-15
Payer: COMMERCIAL

## 2024-01-15 ENCOUNTER — PROCEDURE VISIT (OUTPATIENT)
Dept: OBSTETRICS AND GYNECOLOGY | Facility: CLINIC | Age: 39
End: 2024-01-15
Payer: COMMERCIAL

## 2024-01-15 VITALS — WEIGHT: 179.2 LBS | DIASTOLIC BLOOD PRESSURE: 60 MMHG | SYSTOLIC BLOOD PRESSURE: 101 MMHG | BODY MASS INDEX: 30.76 KG/M2

## 2024-01-15 DIAGNOSIS — O09.523 MULTIGRAVIDA OF ADVANCED MATERNAL AGE IN THIRD TRIMESTER (HHS-HCC): ICD-10-CM

## 2024-01-15 DIAGNOSIS — O09.813 PREGNANCY RESULTING FROM ASSISTED REPRODUCTIVE TECHNOLOGY IN THIRD TRIMESTER (HHS-HCC): Primary | ICD-10-CM

## 2024-01-15 DIAGNOSIS — O45.92 PLACENTAL ABRUPTION IN SECOND TRIMESTER (HHS-HCC): ICD-10-CM

## 2024-01-15 DIAGNOSIS — O99.012 ANEMIA AFFECTING PREGNANCY IN SECOND TRIMESTER (HHS-HCC): ICD-10-CM

## 2024-01-15 DIAGNOSIS — E03.8 HYPOTHYROIDISM DUE TO HASHIMOTO'S THYROIDITIS: ICD-10-CM

## 2024-01-15 DIAGNOSIS — E06.3 HYPOTHYROIDISM DUE TO HASHIMOTO'S THYROIDITIS: ICD-10-CM

## 2024-01-15 DIAGNOSIS — O09.813 PREGNANCY RESULTING FROM ASSISTED REPRODUCTIVE TECHNOLOGY IN THIRD TRIMESTER (HHS-HCC): ICD-10-CM

## 2024-01-15 DIAGNOSIS — R73.09 ELEVATED GLUCOSE TOLERANCE TEST: ICD-10-CM

## 2024-01-15 PROBLEM — N97.9 PRIMARY FEMALE INFERTILITY: Status: RESOLVED | Noted: 2023-10-16 | Resolved: 2024-01-15

## 2024-01-15 PROCEDURE — 87081 CULTURE SCREEN ONLY: CPT | Performed by: OBSTETRICS & GYNECOLOGY

## 2024-01-15 PROCEDURE — 59025 FETAL NON-STRESS TEST: CPT | Performed by: OBSTETRICS & GYNECOLOGY

## 2024-01-15 PROCEDURE — 0501F PRENATAL FLOW SHEET: CPT | Performed by: OBSTETRICS & GYNECOLOGY

## 2024-01-15 RX ORDER — FERROUS SULFATE 324(65)MG
65 TABLET, DELAYED RELEASE (ENTERIC COATED) ORAL EVERY OTHER DAY
Qty: 45 TABLET | Refills: 3 | Status: SHIPPED | OUTPATIENT
Start: 2024-01-15 | End: 2024-06-04 | Stop reason: WASHOUT

## 2024-01-15 NOTE — PATIENT INSTRUCTIONS
DR. PALUMBO'S DELIVERY GUIDE    HOW SHOULD I PREPARE?  Think about what you want your delivery to be like and let your team know  Don't hesitate to speak up if you have concerns or questions  Stay mentally flexible - even with modern medicine, delivery can be unpredictable!  Make sure you have reliable and timely transportation to the hospital  Keep your gas tank at least ¼ full  Keep a towel in the car (to catch amniotic fluid if your water breaks)  Plan for childcare, weather-related traffic delays, etc.  Hospital bag suggestions: birth plan, phone chargers, personal toiletries, hair accessories, chewing gum, water bottle, personal pillow or blanket, things to help you relax during labor, change of clothes for postpartum, nursing bra, eye mask and ear plugs, flip flops for shower, personal towel for shower, baby clothes for going home (bring 1 size up and down from expected size)    WHEN SHOULD I CALL?  When your water breaks (can feel like a gush, or a non-stop trickle of fluid)  When your contractions have been happening regularly for at least 2 hours non-stop AND  First Delivery: Contractions are occurring every 5 minutes or less  Repeat Delivery/Planned : Contractions are occurring every 8 minutes or less  If the baby is moving less than usual   If you experience bleeding like a period  For any other symptoms that just doesn't feel “right” to you    WHO DO I CALL?  Call [phone] for the [facility] office and [phone] for the [place] office. During the day, ask the  to direct you to the office nurse. After hours, you will be directed to the doctor on Labor & Delivery by the answering service.    WHERE DO I DELIVER?  Low risk pregnancies have the option to deliver at any  hospital. High risk pregnancies should deliver at [facility]. Dr. Palumbo only delivers at [facility], typically on Thursday nights. To schedule a hospital tour, call [phone].    WHO CAN BE  WITH ME?  The visitor policy can be found online at [email or web address]/[email or web address]-information. Certified doulas do not count as visitors.     HOW CAN I DECREASE MY RISK FOR  DELIVERY?  Keep your body as strong and healthy as possible  Look through the [employer]® website to understand how movement can help your baby get in the right place in the pelvis   Use professional birth support, such as a    Change positions frequently during labor  Stay well hydrated throughout your labor, including “clears” for caloric nourishment (honey water, apple juice, etc.)   Allow enough time for labor - it can take over 24 hours!  Rest when you can so you are mentally ready to push once you get to 10cm dilated  Try pushing in a different position if you're not making progress. Consider pushing on hands and knees, closed knee pushing, use of a birthing bar, etc.     Delivery is a team effort. Please speak up if you don't feel included in the decision-making.       DR. LIN'S POSTPARTUM GUIDE    Going through all the physical and emotional changes of pregnancy is no small feat, and it's important to realize these changes continue even after delivery. Only about 50% of women go to their postpartum visits, which means a lot of women are missing out on an opportunity to check on their own health, receive support, and ask important questions. Every woman should have a check-up 4-6 weeks after their delivery, and some women will need to be seen even sooner than that.    We highly encourage you to take your postpartum care just as seriously as your prenatal care. Many serious long-term health issues related to pregnancy actually happen postpartum. Our goal is to help you feel supported, capable, and safe as you navigate your unique post-pregnancy journey.    SAFETY  You should immediately contact the doctor's office if you experience any of the following:  Chest pain or trouble  breathing  Blood pressures >150/100, that is just as high or higher when repeated 20 minutes later  Severe abdominal pain not responding to your discharge pain medications  Heavy bleeding fully soaking a pad in under 30 minutes or soiling your clothes   Chills, shakes, or fever >100.4°F  Suicidal thoughts  You should also call anytime you just don't feel right - it is always better to be safe than sorry!    COMMON POSTPARTUM CONCERNS  A variety of other postpartum issues are just as harmful to a woman's wellbeing and health, even if they are not deadly. Please call for an appointment if you have concerns about the following, or any other bothersome issue:  Breastfeeding difficulties  Mood changes such as depression or anxiety  Pain during sex or with activity  Abnormal uterine bleeding past 6-8 weeks postpartum  You are not alone, and we are here to help you!    WHAT TO EXPECT AT YOUR POSTPARTUM VISIT  During your postpartum visit, we will ask you questions about your pregnancy complications, postpartum mood and support, infant feeding journey, contraceptive plans, and more. If your concerns require a more in-depth evaluation, we may ask you to come back for a follow-up visit. Certain follow-up visits may be billed outside of your insurance's OB global package.     POSTPARTUM SUPPORT RESOURCES    [facility] POSTPARTUM PROGRAMMING  https://www.[facility].org/services/obgyn-womens-health/patient-resources/classes-and-support   [phone]  Free parenting support offered via “Mommy and Baby Too!” peer groups and WIC-lead “Healthy Mom and Baby” programs.    [facility] LACTATION SUPPORT  http://www.[facility].org/yanci/health-and-wellness/pregnancy-resources/lactation-services  In person and virtual appointments offered at multiple locations for breastfeeding support.    [facility]  https://Cephasonicso.StandDesk/  [phone]. Provides full spectrum breastfeeding  assistance. Located in the [facility] building, but open to parents seeing other pediatricians.    [facility] PELVIC FLOOR PHYSICAL THERAPY  https://www.[facility].org/services/obgyn-womens-health/female-pelvic-health/conditions-and-treatments/epdpps-kqbfa-cwfpibihuanbyy  [phone]  Licensed female therapists help with a variety of postpartum pelvic floor concerns, including pain, weakness, incontinence, and more. Referral may be required.     [facility] WOMEN'S MENTAL HEALTH CLINIC  https://www.[facility].org/services/psychiatry/conditions-treatments/womens-mental-health   [phone], ask for “Women's Mental Health” providers for help with postpartum anxiety, depression, OCD, PTSD, and more.    HELP ME GROW  http://www.helpmegrow.[place].gov/  Help Me Grow is an evidence-based program that promotes healthy growth and development for babies and young children by providing professional support in the home for pregnant mothers or new parents. You can self-refer through the website or ask your doctor to make a referral.    LOOKING FOR PROFESSIONAL POSTPARTUM SUPPORT?  [employer] ( Certifying Organization)  https://www.libia.org/  Type in your zip code to find a certified postpartum  near you    [address]  http://www.[place].org/    [facility]  http://www.Fortressware.HomeRun/    [facility]  https://www.[email or web address].com     [facility]   https://[email or web address]/

## 2024-01-15 NOTE — PROGRESS NOTES
GBS repeated as last was in Oct 2023  Hypothyroidism: TSH at goal on 150mcg daily  Hx possible 2nd trimester abruption: will discuss timing of delivery with MFM  Anemia: at goal on Feosol every other day. Last Hgb 10.4 on 23.  AMA age 38: NST today, growth at 33 weeks = EFW is at the 78%ile and the AC is at the 87%ile, for repeat at 36 weeks and then weekly NSTs  Already has breast pump. Discussed  colostrum expression. Pt does not feel need to write out a birth plan.    NST for AMA  FHT: 130, mod luz, + accel, - decel  [place]: none  Interpretation: reactive.    Lucrecia Palumbo MD

## 2024-01-18 LAB — GP B STREP GENITAL QL CULT: ABNORMAL

## 2024-01-22 ENCOUNTER — ROUTINE PRENATAL (OUTPATIENT)
Dept: OBSTETRICS AND GYNECOLOGY | Facility: CLINIC | Age: 39
End: 2024-01-22
Payer: COMMERCIAL

## 2024-01-22 ENCOUNTER — HOSPITAL ENCOUNTER (OUTPATIENT)
Dept: RADIOLOGY | Facility: CLINIC | Age: 39
Discharge: HOME | End: 2024-01-22
Payer: COMMERCIAL

## 2024-01-22 VITALS — BODY MASS INDEX: 31.07 KG/M2 | DIASTOLIC BLOOD PRESSURE: 70 MMHG | WEIGHT: 181 LBS | SYSTOLIC BLOOD PRESSURE: 108 MMHG

## 2024-01-22 DIAGNOSIS — O45.92 PLACENTAL ABRUPTION IN SECOND TRIMESTER (HHS-HCC): ICD-10-CM

## 2024-01-22 DIAGNOSIS — O09.523 MULTIGRAVIDA OF ADVANCED MATERNAL AGE IN THIRD TRIMESTER (HHS-HCC): Primary | ICD-10-CM

## 2024-01-22 DIAGNOSIS — O09.813 PREGNANCY RESULTING FROM ASSISTED REPRODUCTIVE TECHNOLOGY IN THIRD TRIMESTER (HHS-HCC): ICD-10-CM

## 2024-01-22 DIAGNOSIS — O99.012 ANEMIA AFFECTING PREGNANCY IN SECOND TRIMESTER (HHS-HCC): ICD-10-CM

## 2024-01-22 DIAGNOSIS — Z32.01 PREGNANCY TEST POSITIVE (HHS-HCC): ICD-10-CM

## 2024-01-22 PROCEDURE — 76816 OB US FOLLOW-UP PER FETUS: CPT | Performed by: OBSTETRICS & GYNECOLOGY

## 2024-01-22 PROCEDURE — 76816 OB US FOLLOW-UP PER FETUS: CPT

## 2024-01-22 PROCEDURE — 76819 FETAL BIOPHYS PROFIL W/O NST: CPT | Performed by: OBSTETRICS & GYNECOLOGY

## 2024-01-22 PROCEDURE — 0501F PRENATAL FLOW SHEET: CPT | Performed by: OBSTETRICS & GYNECOLOGY

## 2024-01-22 PROCEDURE — 76819 FETAL BIOPHYS PROFIL W/O NST: CPT

## 2024-01-22 NOTE — PROGRESS NOTES
GBS pos for PCN  Hypothyroidism: TSH at goal on 150mcg daily  Hx possible 2nd trimester abruption: M recommended delivery at 37 weeks given potential risk for repeat bleed, though 39 weeks not unreasonable with shared decision making. Pt would like to wait until 39 weeks given her stability and desire to be more ready for natural labor.   Anemia: at goal on Feosol every other day. Last Hgb 10.4 on 12/22/23.  AMA age 38: repeat growth today, growth at 33 weeks = EFW 78%ile    Serial membrane sweeps planned at 37 weeks.  Pt will discuss IOL with  and plan on timing.    Lucrecia Palumbo MD

## 2024-01-29 ENCOUNTER — ROUTINE PRENATAL (OUTPATIENT)
Dept: OBSTETRICS AND GYNECOLOGY | Facility: CLINIC | Age: 39
End: 2024-01-29
Payer: COMMERCIAL

## 2024-01-29 ENCOUNTER — APPOINTMENT (OUTPATIENT)
Dept: OBSTETRICS AND GYNECOLOGY | Facility: CLINIC | Age: 39
End: 2024-01-29
Payer: COMMERCIAL

## 2024-01-29 VITALS — DIASTOLIC BLOOD PRESSURE: 76 MMHG | WEIGHT: 184.6 LBS | SYSTOLIC BLOOD PRESSURE: 114 MMHG | BODY MASS INDEX: 31.69 KG/M2

## 2024-01-29 DIAGNOSIS — O45.92 PLACENTAL ABRUPTION IN SECOND TRIMESTER (HHS-HCC): ICD-10-CM

## 2024-01-29 DIAGNOSIS — O09.813 PREGNANCY RESULTING FROM ASSISTED REPRODUCTIVE TECHNOLOGY IN THIRD TRIMESTER (HHS-HCC): ICD-10-CM

## 2024-01-29 DIAGNOSIS — O09.523 MULTIGRAVIDA OF ADVANCED MATERNAL AGE IN THIRD TRIMESTER (HHS-HCC): ICD-10-CM

## 2024-01-29 DIAGNOSIS — O09.93 SUPERVISION OF HIGH RISK PREGNANCY IN THIRD TRIMESTER (HHS-HCC): Primary | ICD-10-CM

## 2024-01-29 DIAGNOSIS — O09.813 PREGNANCY RESULTING FROM ASSISTED REPRODUCTIVE TECHNOLOGY IN THIRD TRIMESTER (HHS-HCC): Primary | ICD-10-CM

## 2024-01-29 DIAGNOSIS — O99.012 ANEMIA AFFECTING PREGNANCY IN SECOND TRIMESTER (HHS-HCC): ICD-10-CM

## 2024-01-29 PROCEDURE — 0501F PRENATAL FLOW SHEET: CPT | Performed by: OBSTETRICS & GYNECOLOGY

## 2024-01-29 PROCEDURE — 59025 FETAL NON-STRESS TEST: CPT | Performed by: OBSTETRICS & GYNECOLOGY

## 2024-01-29 NOTE — PROGRESS NOTES
GBS pos for PCN  Hypothyroidism: TSH at goal on 150mcg daily  Hx possible 2nd trimester abruption: MFM recommended delivery at 37 weeks given potential risk for repeat bleed, but pt would prefer waiting until 39 weeks given stability since last admission and feeling that she would like to go into her own labor. SVE and membrane sweep offered today, and accepted. Pt will have weekly NSTs and plan for IOL at 39 weeks.  Anemia: at goal on Feosol     RN to schedule IOL at 39 weeks at [facility]. Pt prefers Wed night.  Pt will have one more apptmt with NST next Monday.    NST for hx placental abruption    FHT: 140, mod luz, + accel, - decel  [place]: none  Interpretation: reactive.      Seen and d/w Dr. Palumbo.  Roxane Forde MD PGY-1 OB/GYN     I saw and evaluated the patient. I personally obtained the key and critical portions of the history and physical exam or was physically present for key and critical portions performed by the resident/fellow. I reviewed the resident/fellow's documentation and discussed the patient with the resident/fellow. I agree with the resident/fellow's medical decision making as documented in the note.    Lucrecia Palumbo MD

## 2024-02-01 ENCOUNTER — APPOINTMENT (OUTPATIENT)
Dept: OBSTETRICS AND GYNECOLOGY | Facility: CLINIC | Age: 39
End: 2024-02-01
Payer: COMMERCIAL

## 2024-02-05 ENCOUNTER — APPOINTMENT (OUTPATIENT)
Dept: OBSTETRICS AND GYNECOLOGY | Facility: CLINIC | Age: 39
End: 2024-02-05
Payer: COMMERCIAL

## 2024-02-05 ENCOUNTER — PROCEDURE VISIT (OUTPATIENT)
Dept: OBSTETRICS AND GYNECOLOGY | Facility: CLINIC | Age: 39
End: 2024-02-05
Payer: COMMERCIAL

## 2024-02-05 ENCOUNTER — ROUTINE PRENATAL (OUTPATIENT)
Dept: OBSTETRICS AND GYNECOLOGY | Facility: CLINIC | Age: 39
End: 2024-02-05
Payer: COMMERCIAL

## 2024-02-05 VITALS — DIASTOLIC BLOOD PRESSURE: 69 MMHG | SYSTOLIC BLOOD PRESSURE: 108 MMHG | WEIGHT: 187 LBS | BODY MASS INDEX: 32.1 KG/M2

## 2024-02-05 DIAGNOSIS — O99.012 ANEMIA AFFECTING PREGNANCY IN SECOND TRIMESTER (HHS-HCC): ICD-10-CM

## 2024-02-05 DIAGNOSIS — O09.813 PREGNANCY RESULTING FROM ASSISTED REPRODUCTIVE TECHNOLOGY IN THIRD TRIMESTER (HHS-HCC): ICD-10-CM

## 2024-02-05 DIAGNOSIS — O45.92 PLACENTAL ABRUPTION IN SECOND TRIMESTER (HHS-HCC): ICD-10-CM

## 2024-02-05 DIAGNOSIS — O09.523 MULTIGRAVIDA OF ADVANCED MATERNAL AGE IN THIRD TRIMESTER (HHS-HCC): Primary | ICD-10-CM

## 2024-02-05 DIAGNOSIS — O09.93 SUPERVISION OF HIGH RISK PREGNANCY IN THIRD TRIMESTER (HHS-HCC): ICD-10-CM

## 2024-02-05 DIAGNOSIS — O09.523 MULTIGRAVIDA OF ADVANCED MATERNAL AGE IN THIRD TRIMESTER (HHS-HCC): ICD-10-CM

## 2024-02-05 PROCEDURE — 59025 FETAL NON-STRESS TEST: CPT | Performed by: OBSTETRICS & GYNECOLOGY

## 2024-02-05 PROCEDURE — 59426 ANTEPARTUM CARE ONLY: CPT | Performed by: OBSTETRICS & GYNECOLOGY

## 2024-02-05 NOTE — PROGRESS NOTES
IOL scheduled for 2/7   No concerning sx today for abruption    NST for hx placental abruption    FHT: 130, mod luz, + accel, - decel  [place]: single ctx seen in 20 minutes  Interpretation: reactive.      Lucrecia Palumbo MD

## 2024-02-07 ENCOUNTER — HOSPITAL ENCOUNTER (INPATIENT)
Facility: HOSPITAL | Age: 39
LOS: 3 days | Discharge: HOME | End: 2024-02-10
Attending: OBSTETRICS & GYNECOLOGY | Admitting: OBSTETRICS & GYNECOLOGY
Payer: COMMERCIAL

## 2024-02-07 LAB
ABO GROUP (TYPE) IN BLOOD: NORMAL
ANTIBODY SCREEN: NORMAL
ERYTHROCYTE [DISTWIDTH] IN BLOOD BY AUTOMATED COUNT: 15.5 % (ref 11.5–14.5)
HCT VFR BLD AUTO: 33.8 % (ref 36–46)
HGB BLD-MCNC: 11.4 G/DL (ref 12–16)
MCH RBC QN AUTO: 28 PG (ref 26–34)
MCHC RBC AUTO-ENTMCNC: 33.7 G/DL (ref 32–36)
MCV RBC AUTO: 83 FL (ref 80–100)
NRBC BLD-RTO: 0 /100 WBCS (ref 0–0)
PLATELET # BLD AUTO: 277 X10*3/UL (ref 150–450)
RBC # BLD AUTO: 4.07 X10*6/UL (ref 4–5.2)
RH FACTOR (ANTIGEN D): NORMAL
TREPONEMA PALLIDUM IGG+IGM AB [PRESENCE] IN SERUM OR PLASMA BY IMMUNOASSAY: NONREACTIVE
WBC # BLD AUTO: 10.1 X10*3/UL (ref 4.4–11.3)

## 2024-02-07 PROCEDURE — 3E033VJ INTRODUCTION OF OTHER HORMONE INTO PERIPHERAL VEIN, PERCUTANEOUS APPROACH: ICD-10-PCS | Performed by: OBSTETRICS & GYNECOLOGY

## 2024-02-07 PROCEDURE — 1120000001 HC OB PRIVATE ROOM DAILY

## 2024-02-07 PROCEDURE — 86901 BLOOD TYPING SEROLOGIC RH(D): CPT

## 2024-02-07 PROCEDURE — 2500000004 HC RX 250 GENERAL PHARMACY W/ HCPCS (ALT 636 FOR OP/ED)

## 2024-02-07 PROCEDURE — 99223 1ST HOSP IP/OBS HIGH 75: CPT

## 2024-02-07 PROCEDURE — 85027 COMPLETE CBC AUTOMATED: CPT

## 2024-02-07 PROCEDURE — 36415 COLL VENOUS BLD VENIPUNCTURE: CPT

## 2024-02-07 PROCEDURE — 86780 TREPONEMA PALLIDUM: CPT

## 2024-02-07 PROCEDURE — 86920 COMPATIBILITY TEST SPIN: CPT

## 2024-02-07 RX ORDER — ONDANSETRON 4 MG/1
4 TABLET, FILM COATED ORAL EVERY 6 HOURS PRN
Status: DISCONTINUED | OUTPATIENT
Start: 2024-02-07 | End: 2024-02-08 | Stop reason: SDUPTHER

## 2024-02-07 RX ORDER — CARBOPROST TROMETHAMINE 250 UG/ML
250 INJECTION, SOLUTION INTRAMUSCULAR ONCE AS NEEDED
Status: DISCONTINUED | OUTPATIENT
Start: 2024-02-07 | End: 2024-02-08 | Stop reason: SDUPTHER

## 2024-02-07 RX ORDER — PENICILLIN G 3000000 [IU]/50ML
3 INJECTION, SOLUTION INTRAVENOUS EVERY 4 HOURS
Status: DISCONTINUED | OUTPATIENT
Start: 2024-02-08 | End: 2024-02-08

## 2024-02-07 RX ORDER — TRANEXAMIC ACID 100 MG/ML
1000 INJECTION, SOLUTION INTRAVENOUS ONCE AS NEEDED
Status: DISCONTINUED | OUTPATIENT
Start: 2024-02-07 | End: 2024-02-08 | Stop reason: SDUPTHER

## 2024-02-07 RX ORDER — ONDANSETRON HYDROCHLORIDE 2 MG/ML
4 INJECTION, SOLUTION INTRAVENOUS EVERY 6 HOURS PRN
Status: DISCONTINUED | OUTPATIENT
Start: 2024-02-07 | End: 2024-02-08 | Stop reason: SDUPTHER

## 2024-02-07 RX ORDER — LOPERAMIDE HYDROCHLORIDE 2 MG/1
4 CAPSULE ORAL EVERY 2 HOUR PRN
Status: DISCONTINUED | OUTPATIENT
Start: 2024-02-07 | End: 2024-02-08 | Stop reason: SDUPTHER

## 2024-02-07 RX ORDER — METOCLOPRAMIDE 10 MG/1
10 TABLET ORAL EVERY 6 HOURS PRN
Status: DISCONTINUED | OUTPATIENT
Start: 2024-02-07 | End: 2024-02-08

## 2024-02-07 RX ORDER — OXYTOCIN/0.9 % SODIUM CHLORIDE 30/500 ML
60 PLASTIC BAG, INJECTION (ML) INTRAVENOUS ONCE AS NEEDED
Status: DISCONTINUED | OUTPATIENT
Start: 2024-02-07 | End: 2024-02-08

## 2024-02-07 RX ORDER — SODIUM CHLORIDE, SODIUM LACTATE, POTASSIUM CHLORIDE, CALCIUM CHLORIDE 600; 310; 30; 20 MG/100ML; MG/100ML; MG/100ML; MG/100ML
125 INJECTION, SOLUTION INTRAVENOUS CONTINUOUS
Status: DISCONTINUED | OUTPATIENT
Start: 2024-02-07 | End: 2024-02-08

## 2024-02-07 RX ORDER — NIFEDIPINE 10 MG/1
10 CAPSULE ORAL ONCE AS NEEDED
Status: DISCONTINUED | OUTPATIENT
Start: 2024-02-07 | End: 2024-02-08 | Stop reason: SDUPTHER

## 2024-02-07 RX ORDER — HYDRALAZINE HYDROCHLORIDE 20 MG/ML
5 INJECTION INTRAMUSCULAR; INTRAVENOUS ONCE AS NEEDED
Status: DISCONTINUED | OUTPATIENT
Start: 2024-02-07 | End: 2024-02-08 | Stop reason: SDUPTHER

## 2024-02-07 RX ORDER — TERBUTALINE SULFATE 1 MG/ML
0.25 INJECTION SUBCUTANEOUS ONCE AS NEEDED
Status: COMPLETED | OUTPATIENT
Start: 2024-02-07 | End: 2024-02-08

## 2024-02-07 RX ORDER — OXYTOCIN 10 [USP'U]/ML
10 INJECTION, SOLUTION INTRAMUSCULAR; INTRAVENOUS ONCE AS NEEDED
Status: DISCONTINUED | OUTPATIENT
Start: 2024-02-07 | End: 2024-02-08

## 2024-02-07 RX ORDER — LIDOCAINE HYDROCHLORIDE 10 MG/ML
30 INJECTION INFILTRATION; PERINEURAL ONCE AS NEEDED
Status: DISCONTINUED | OUTPATIENT
Start: 2024-02-07 | End: 2024-02-08

## 2024-02-07 RX ORDER — METOCLOPRAMIDE HYDROCHLORIDE 5 MG/ML
10 INJECTION INTRAMUSCULAR; INTRAVENOUS EVERY 6 HOURS PRN
Status: DISCONTINUED | OUTPATIENT
Start: 2024-02-07 | End: 2024-02-08

## 2024-02-07 RX ORDER — METHYLERGONOVINE MALEATE 0.2 MG/ML
0.2 INJECTION INTRAVENOUS ONCE AS NEEDED
Status: COMPLETED | OUTPATIENT
Start: 2024-02-07 | End: 2024-02-08

## 2024-02-07 RX ORDER — MISOPROSTOL 200 UG/1
800 TABLET ORAL ONCE AS NEEDED
Status: DISCONTINUED | OUTPATIENT
Start: 2024-02-07 | End: 2024-02-08 | Stop reason: SDUPTHER

## 2024-02-07 RX ORDER — OXYTOCIN/0.9 % SODIUM CHLORIDE 30/500 ML
2-30 PLASTIC BAG, INJECTION (ML) INTRAVENOUS CONTINUOUS
Status: DISCONTINUED | OUTPATIENT
Start: 2024-02-07 | End: 2024-02-08

## 2024-02-07 RX ORDER — LABETALOL HYDROCHLORIDE 5 MG/ML
20 INJECTION, SOLUTION INTRAVENOUS ONCE AS NEEDED
Status: DISCONTINUED | OUTPATIENT
Start: 2024-02-07 | End: 2024-02-08 | Stop reason: SDUPTHER

## 2024-02-07 RX ADMIN — SODIUM CHLORIDE, POTASSIUM CHLORIDE, SODIUM LACTATE AND CALCIUM CHLORIDE 125 ML/HR: 600; 310; 30; 20 INJECTION, SOLUTION INTRAVENOUS at 21:59

## 2024-02-07 RX ADMIN — PENICILLIN G POTASSIUM 5 MILLION UNITS: 5000000 INJECTION, POWDER, FOR SOLUTION INTRAMUSCULAR; INTRAVENOUS at 21:51

## 2024-02-07 RX ADMIN — Medication 2 MILLI-UNITS/MIN: at 22:30

## 2024-02-07 SDOH — SOCIAL STABILITY: SOCIAL INSECURITY: PHYSICAL ABUSE: DENIES

## 2024-02-07 SDOH — HEALTH STABILITY: MENTAL HEALTH: ACTIVE SUICIDAL IDEATION WITH SOME INTENT TO ACT, WITHOUT SPECIFIC PLAN (PAST 1 MONTH): NO

## 2024-02-07 SDOH — HEALTH STABILITY: MENTAL HEALTH: NON-SPECIFIC ACTIVE SUICIDAL THOUGHTS (PAST 1 MONTH): NO

## 2024-02-07 SDOH — SOCIAL STABILITY: SOCIAL INSECURITY: ARE THERE ANY APPARENT SIGNS OF INJURIES/BEHAVIORS THAT COULD BE RELATED TO ABUSE/NEGLECT?: NO

## 2024-02-07 SDOH — HEALTH STABILITY: MENTAL HEALTH: ACTIVE SUICIDAL IDEATION WITH SPECIFIC PLAN AND INTENT (PAST 1 MONTH): NO

## 2024-02-07 SDOH — SOCIAL STABILITY: SOCIAL INSECURITY: VERBAL ABUSE: DENIES

## 2024-02-07 SDOH — SOCIAL STABILITY: SOCIAL INSECURITY: DOES ANYONE TRY TO KEEP YOU FROM HAVING/CONTACTING OTHER FRIENDS OR DOING THINGS OUTSIDE YOUR HOME?: NO

## 2024-02-07 SDOH — ECONOMIC STABILITY: HOUSING INSECURITY: DO YOU FEEL UNSAFE GOING BACK TO THE PLACE WHERE YOU ARE LIVING?: NO

## 2024-02-07 SDOH — SOCIAL STABILITY: SOCIAL INSECURITY: ABUSE SCREEN: ADULT

## 2024-02-07 SDOH — SOCIAL STABILITY: SOCIAL INSECURITY: HAS ANYONE EVER THREATENED TO HURT YOUR FAMILY OR YOUR PETS?: NO

## 2024-02-07 SDOH — SOCIAL STABILITY: SOCIAL INSECURITY: ARE YOU OR HAVE YOU BEEN THREATENED OR ABUSED PHYSICALLY, EMOTIONALLY, OR SEXUALLY BY ANYONE?: NO

## 2024-02-07 SDOH — HEALTH STABILITY: MENTAL HEALTH: SUICIDAL BEHAVIOR (LIFETIME): NO

## 2024-02-07 SDOH — SOCIAL STABILITY: SOCIAL INSECURITY: HAVE YOU HAD THOUGHTS OF HARMING ANYONE ELSE?: NO

## 2024-02-07 SDOH — HEALTH STABILITY: MENTAL HEALTH: WERE YOU ABLE TO COMPLETE ALL THE BEHAVIORAL HEALTH SCREENINGS?: YES

## 2024-02-07 SDOH — HEALTH STABILITY: MENTAL HEALTH: WISH TO BE DEAD (PAST 1 MONTH): NO

## 2024-02-07 SDOH — SOCIAL STABILITY: SOCIAL INSECURITY: DO YOU FEEL ANYONE HAS EXPLOITED OR TAKEN ADVANTAGE OF YOU FINANCIALLY OR OF YOUR PERSONAL PROPERTY?: NO

## 2024-02-07 SDOH — HEALTH STABILITY: MENTAL HEALTH: HAVE YOU USED ANY PRESCRIPTION DRUGS OTHER THAN PRESCRIBED IN THE PAST 12 MONTHS?: NO

## 2024-02-07 SDOH — HEALTH STABILITY: MENTAL HEALTH: HAVE YOU USED ANY SUBSTANCES (CANABIS, COCAINE, HEROIN, HALLUCINOGENS, INHALANTS, ETC.) IN THE PAST 12 MONTHS?: NO

## 2024-02-07 ASSESSMENT — PAIN SCALES - GENERAL
PAINLEVEL_OUTOF10: 0 - NO PAIN

## 2024-02-07 ASSESSMENT — LIFESTYLE VARIABLES
HOW OFTEN DO YOU HAVE 6 OR MORE DRINKS ON ONE OCCASION: NEVER
HOW MANY STANDARD DRINKS CONTAINING ALCOHOL DO YOU HAVE ON A TYPICAL DAY: PATIENT DOES NOT DRINK
HOW OFTEN DO YOU HAVE A DRINK CONTAINING ALCOHOL: NEVER
AUDIT-C TOTAL SCORE: 0
SKIP TO QUESTIONS 9-10: 1
AUDIT-C TOTAL SCORE: 0

## 2024-02-07 ASSESSMENT — PATIENT HEALTH QUESTIONNAIRE - PHQ9
1. LITTLE INTEREST OR PLEASURE IN DOING THINGS: NOT AT ALL
2. FEELING DOWN, DEPRESSED OR HOPELESS: NOT AT ALL
SUM OF ALL RESPONSES TO PHQ9 QUESTIONS 1 & 2: 0

## 2024-02-08 ENCOUNTER — ANESTHESIA (OUTPATIENT)
Dept: OBSTETRICS AND GYNECOLOGY | Facility: HOSPITAL | Age: 39
End: 2024-02-08
Payer: COMMERCIAL

## 2024-02-08 ENCOUNTER — ANESTHESIA EVENT (OUTPATIENT)
Dept: OBSTETRICS AND GYNECOLOGY | Facility: HOSPITAL | Age: 39
End: 2024-02-08
Payer: COMMERCIAL

## 2024-02-08 PROCEDURE — 10907ZC DRAINAGE OF AMNIOTIC FLUID, THERAPEUTIC FROM PRODUCTS OF CONCEPTION, VIA NATURAL OR ARTIFICIAL OPENING: ICD-10-PCS | Performed by: OBSTETRICS & GYNECOLOGY

## 2024-02-08 PROCEDURE — 01968 ANES/ANALG CS DLVR NEURAXIAL: CPT | Performed by: STUDENT IN AN ORGANIZED HEALTH CARE EDUCATION/TRAINING PROGRAM

## 2024-02-08 PROCEDURE — 2500000001 HC RX 250 WO HCPCS SELF ADMINISTERED DRUGS (ALT 637 FOR MEDICARE OP): Performed by: STUDENT IN AN ORGANIZED HEALTH CARE EDUCATION/TRAINING PROGRAM

## 2024-02-08 PROCEDURE — 2500000004 HC RX 250 GENERAL PHARMACY W/ HCPCS (ALT 636 FOR OP/ED)

## 2024-02-08 PROCEDURE — 2500000004 HC RX 250 GENERAL PHARMACY W/ HCPCS (ALT 636 FOR OP/ED): Performed by: NURSE PRACTITIONER

## 2024-02-08 PROCEDURE — 59515 CESAREAN DELIVERY: CPT

## 2024-02-08 PROCEDURE — 01968 ANES/ANALG CS DLVR NEURAXIAL: CPT

## 2024-02-08 PROCEDURE — 3700000018 HC OB ANESTHESIA C-SECTION: Performed by: OBSTETRICS & GYNECOLOGY

## 2024-02-08 PROCEDURE — 2500000004 HC RX 250 GENERAL PHARMACY W/ HCPCS (ALT 636 FOR OP/ED): Performed by: STUDENT IN AN ORGANIZED HEALTH CARE EDUCATION/TRAINING PROGRAM

## 2024-02-08 PROCEDURE — 2500000005 HC RX 250 GENERAL PHARMACY W/O HCPCS

## 2024-02-08 PROCEDURE — 59514 CESAREAN DELIVERY ONLY: CPT | Performed by: OBSTETRICS & GYNECOLOGY

## 2024-02-08 PROCEDURE — 59050 FETAL MONITOR W/REPORT: CPT | Performed by: OBSTETRICS & GYNECOLOGY

## 2024-02-08 PROCEDURE — 1100000001 HC PRIVATE ROOM DAILY

## 2024-02-08 PROCEDURE — 88307 TISSUE EXAM BY PATHOLOGIST: CPT | Mod: TC,SUR

## 2024-02-08 PROCEDURE — 01967 NEURAXL LBR ANES VAG DLVR: CPT | Performed by: STUDENT IN AN ORGANIZED HEALTH CARE EDUCATION/TRAINING PROGRAM

## 2024-02-08 PROCEDURE — 2720000007 HC OR 272 NO HCPCS: Performed by: OBSTETRICS & GYNECOLOGY

## 2024-02-08 PROCEDURE — 88307 TISSUE EXAM BY PATHOLOGIST: CPT | Performed by: STUDENT IN AN ORGANIZED HEALTH CARE EDUCATION/TRAINING PROGRAM

## 2024-02-08 PROCEDURE — 3700000014 HC AN EPIDURAL BLOCK CHARGE: Performed by: OBSTETRICS & GYNECOLOGY

## 2024-02-08 PROCEDURE — 7100000016 HC LABOR RECOVERY PER HOUR: Performed by: OBSTETRICS & GYNECOLOGY

## 2024-02-08 PROCEDURE — 7210000002 HC LABOR PER HOUR: Performed by: OBSTETRICS & GYNECOLOGY

## 2024-02-08 RX ORDER — NORETHINDRONE AND ETHINYL ESTRADIOL 0.5-0.035
KIT ORAL AS NEEDED
Status: DISCONTINUED | OUTPATIENT
Start: 2024-02-08 | End: 2024-02-08

## 2024-02-08 RX ORDER — KETOROLAC TROMETHAMINE 30 MG/ML
INJECTION, SOLUTION INTRAMUSCULAR; INTRAVENOUS AS NEEDED
Status: DISCONTINUED | OUTPATIENT
Start: 2024-02-08 | End: 2024-02-08

## 2024-02-08 RX ORDER — FENTANYL/BUPIVACAINE/NS/PF 2MCG/ML-.1
PLASTIC BAG, INJECTION (ML) INJECTION AS NEEDED
Status: DISCONTINUED | OUTPATIENT
Start: 2024-02-08 | End: 2024-02-08

## 2024-02-08 RX ORDER — HYDRALAZINE HYDROCHLORIDE 20 MG/ML
5 INJECTION INTRAMUSCULAR; INTRAVENOUS ONCE AS NEEDED
Status: DISCONTINUED | OUTPATIENT
Start: 2024-02-08 | End: 2024-02-10 | Stop reason: HOSPADM

## 2024-02-08 RX ORDER — LIDOCAINE 560 MG/1
1 PATCH PERCUTANEOUS; TOPICAL; TRANSDERMAL
Status: DISCONTINUED | OUTPATIENT
Start: 2024-02-08 | End: 2024-02-10 | Stop reason: HOSPADM

## 2024-02-08 RX ORDER — LEVOTHYROXINE SODIUM 150 UG/1
150 TABLET ORAL DAILY
Status: DISCONTINUED | OUTPATIENT
Start: 2024-02-08 | End: 2024-02-10 | Stop reason: HOSPADM

## 2024-02-08 RX ORDER — CEFAZOLIN 1 G/1
INJECTION, POWDER, FOR SOLUTION INTRAVENOUS AS NEEDED
Status: DISCONTINUED | OUTPATIENT
Start: 2024-02-08 | End: 2024-02-08

## 2024-02-08 RX ORDER — METHYLERGONOVINE MALEATE 0.2 MG/ML
0.2 INJECTION INTRAVENOUS ONCE AS NEEDED
Status: DISCONTINUED | OUTPATIENT
Start: 2024-02-08 | End: 2024-02-10 | Stop reason: HOSPADM

## 2024-02-08 RX ORDER — DIPHENHYDRAMINE HCL 25 MG
25 CAPSULE ORAL EVERY 4 HOURS PRN
Status: DISCONTINUED | OUTPATIENT
Start: 2024-02-08 | End: 2024-02-10 | Stop reason: HOSPADM

## 2024-02-08 RX ORDER — OXYCODONE HYDROCHLORIDE 5 MG/1
5 TABLET ORAL EVERY 4 HOURS PRN
Status: DISCONTINUED | OUTPATIENT
Start: 2024-02-09 | End: 2024-02-10 | Stop reason: HOSPADM

## 2024-02-08 RX ORDER — BISACODYL 10 MG/1
10 SUPPOSITORY RECTAL DAILY PRN
Status: DISCONTINUED | OUTPATIENT
Start: 2024-02-08 | End: 2024-02-10 | Stop reason: HOSPADM

## 2024-02-08 RX ORDER — CHLOROPROCAINE HYDROCHLORIDE 30 MG/ML
INJECTION, SOLUTION EPIDURAL; INFILTRATION; INTRACAUDAL; PERINEURAL AS NEEDED
Status: DISCONTINUED | OUTPATIENT
Start: 2024-02-08 | End: 2024-02-08

## 2024-02-08 RX ORDER — ONDANSETRON HYDROCHLORIDE 2 MG/ML
4 INJECTION, SOLUTION INTRAVENOUS EVERY 6 HOURS PRN
Status: DISCONTINUED | OUTPATIENT
Start: 2024-02-08 | End: 2024-02-10 | Stop reason: HOSPADM

## 2024-02-08 RX ORDER — ACETAMINOPHEN 325 MG/1
975 TABLET ORAL EVERY 6 HOURS
Status: DISCONTINUED | OUTPATIENT
Start: 2024-02-08 | End: 2024-02-10 | Stop reason: HOSPADM

## 2024-02-08 RX ORDER — LIDOCAINE HCL/EPINEPHRINE/PF 2%-1:200K
VIAL (ML) INJECTION AS NEEDED
Status: DISCONTINUED | OUTPATIENT
Start: 2024-02-08 | End: 2024-02-08

## 2024-02-08 RX ORDER — FAMOTIDINE 10 MG/ML
INJECTION INTRAVENOUS AS NEEDED
Status: DISCONTINUED | OUTPATIENT
Start: 2024-02-08 | End: 2024-02-08

## 2024-02-08 RX ORDER — FENTANYL/BUPIVACAINE/NS/PF 2MCG/ML-.1
PLASTIC BAG, INJECTION (ML) INJECTION CONTINUOUS PRN
Status: DISCONTINUED | OUTPATIENT
Start: 2024-02-08 | End: 2024-02-08

## 2024-02-08 RX ORDER — TRANEXAMIC ACID 100 MG/ML
1000 INJECTION, SOLUTION INTRAVENOUS ONCE AS NEEDED
Status: DISCONTINUED | OUTPATIENT
Start: 2024-02-08 | End: 2024-02-10 | Stop reason: HOSPADM

## 2024-02-08 RX ORDER — ADHESIVE BANDAGE
10 BANDAGE TOPICAL
Status: DISCONTINUED | OUTPATIENT
Start: 2024-02-08 | End: 2024-02-10 | Stop reason: HOSPADM

## 2024-02-08 RX ORDER — BUTORPHANOL TARTRATE 1 MG/ML
1 INJECTION INTRAMUSCULAR; INTRAVENOUS
Status: DISCONTINUED | OUTPATIENT
Start: 2024-02-08 | End: 2024-02-10 | Stop reason: HOSPADM

## 2024-02-08 RX ORDER — DIPHENHYDRAMINE HYDROCHLORIDE 50 MG/ML
25 INJECTION INTRAMUSCULAR; INTRAVENOUS EVERY 4 HOURS PRN
Status: DISCONTINUED | OUTPATIENT
Start: 2024-02-08 | End: 2024-02-10 | Stop reason: HOSPADM

## 2024-02-08 RX ORDER — SODIUM CHLORIDE, SODIUM LACTATE, POTASSIUM CHLORIDE, CALCIUM CHLORIDE 600; 310; 30; 20 MG/100ML; MG/100ML; MG/100ML; MG/100ML
125 INJECTION, SOLUTION INTRAVENOUS CONTINUOUS
Status: DISCONTINUED | OUTPATIENT
Start: 2024-02-08 | End: 2024-02-10 | Stop reason: HOSPADM

## 2024-02-08 RX ORDER — ONDANSETRON 4 MG/1
4 TABLET, FILM COATED ORAL EVERY 6 HOURS PRN
Status: DISCONTINUED | OUTPATIENT
Start: 2024-02-08 | End: 2024-02-10 | Stop reason: HOSPADM

## 2024-02-08 RX ORDER — OXYCODONE HYDROCHLORIDE 5 MG/1
10 TABLET ORAL EVERY 4 HOURS PRN
Status: DISCONTINUED | OUTPATIENT
Start: 2024-02-09 | End: 2024-02-10 | Stop reason: HOSPADM

## 2024-02-08 RX ORDER — ENOXAPARIN SODIUM 100 MG/ML
40 INJECTION SUBCUTANEOUS EVERY 24 HOURS
Status: DISCONTINUED | OUTPATIENT
Start: 2024-02-08 | End: 2024-02-10 | Stop reason: HOSPADM

## 2024-02-08 RX ORDER — NIFEDIPINE 10 MG/1
10 CAPSULE ORAL ONCE AS NEEDED
Status: DISCONTINUED | OUTPATIENT
Start: 2024-02-08 | End: 2024-02-10 | Stop reason: HOSPADM

## 2024-02-08 RX ORDER — OXYTOCIN/0.9 % SODIUM CHLORIDE 30/500 ML
60 PLASTIC BAG, INJECTION (ML) INTRAVENOUS ONCE AS NEEDED
Status: DISCONTINUED | OUTPATIENT
Start: 2024-02-08 | End: 2024-02-10 | Stop reason: HOSPADM

## 2024-02-08 RX ORDER — OXYTOCIN 10 [USP'U]/ML
10 INJECTION, SOLUTION INTRAMUSCULAR; INTRAVENOUS ONCE AS NEEDED
Status: DISCONTINUED | OUTPATIENT
Start: 2024-02-08 | End: 2024-02-10 | Stop reason: HOSPADM

## 2024-02-08 RX ORDER — IBUPROFEN 600 MG/1
600 TABLET ORAL EVERY 6 HOURS
Status: DISCONTINUED | OUTPATIENT
Start: 2024-02-09 | End: 2024-02-10 | Stop reason: HOSPADM

## 2024-02-08 RX ORDER — CARBOPROST TROMETHAMINE 250 UG/ML
250 INJECTION, SOLUTION INTRAMUSCULAR ONCE AS NEEDED
Status: DISCONTINUED | OUTPATIENT
Start: 2024-02-08 | End: 2024-02-10 | Stop reason: HOSPADM

## 2024-02-08 RX ORDER — PHENYLEPHRINE HCL IN 0.9% NACL 0.4MG/10ML
SYRINGE (ML) INTRAVENOUS AS NEEDED
Status: DISCONTINUED | OUTPATIENT
Start: 2024-02-08 | End: 2024-02-08

## 2024-02-08 RX ORDER — MORPHINE SULFATE 0.5 MG/ML
INJECTION, SOLUTION EPIDURAL; INTRATHECAL; INTRAVENOUS CONTINUOUS PRN
Status: DISCONTINUED | OUTPATIENT
Start: 2024-02-08 | End: 2024-02-08

## 2024-02-08 RX ORDER — LABETALOL HYDROCHLORIDE 5 MG/ML
20 INJECTION, SOLUTION INTRAVENOUS ONCE AS NEEDED
Status: DISCONTINUED | OUTPATIENT
Start: 2024-02-08 | End: 2024-02-10 | Stop reason: HOSPADM

## 2024-02-08 RX ORDER — LOPERAMIDE HYDROCHLORIDE 2 MG/1
4 CAPSULE ORAL EVERY 2 HOUR PRN
Status: DISCONTINUED | OUTPATIENT
Start: 2024-02-08 | End: 2024-02-10 | Stop reason: HOSPADM

## 2024-02-08 RX ORDER — HYDROMORPHONE HYDROCHLORIDE 1 MG/ML
0.2 INJECTION, SOLUTION INTRAMUSCULAR; INTRAVENOUS; SUBCUTANEOUS EVERY 5 MIN PRN
Status: DISCONTINUED | OUTPATIENT
Start: 2024-02-08 | End: 2024-02-10 | Stop reason: HOSPADM

## 2024-02-08 RX ORDER — SCOLOPAMINE TRANSDERMAL SYSTEM 1 MG/1
1 PATCH, EXTENDED RELEASE TRANSDERMAL
Status: DISCONTINUED | OUTPATIENT
Start: 2024-02-08 | End: 2024-02-10 | Stop reason: HOSPADM

## 2024-02-08 RX ORDER — SIMETHICONE 80 MG
80 TABLET,CHEWABLE ORAL 4 TIMES DAILY PRN
Status: DISCONTINUED | OUTPATIENT
Start: 2024-02-08 | End: 2024-02-10 | Stop reason: HOSPADM

## 2024-02-08 RX ORDER — POLYETHYLENE GLYCOL 3350 17 G/17G
17 POWDER, FOR SOLUTION ORAL 2 TIMES DAILY PRN
Status: DISCONTINUED | OUTPATIENT
Start: 2024-02-08 | End: 2024-02-10 | Stop reason: HOSPADM

## 2024-02-08 RX ORDER — KETOROLAC TROMETHAMINE 30 MG/ML
30 INJECTION, SOLUTION INTRAMUSCULAR; INTRAVENOUS EVERY 6 HOURS
Status: COMPLETED | OUTPATIENT
Start: 2024-02-08 | End: 2024-02-09

## 2024-02-08 RX ORDER — NALOXONE HYDROCHLORIDE 0.4 MG/ML
0.1 INJECTION, SOLUTION INTRAMUSCULAR; INTRAVENOUS; SUBCUTANEOUS EVERY 5 MIN PRN
Status: DISCONTINUED | OUTPATIENT
Start: 2024-02-08 | End: 2024-02-10 | Stop reason: HOSPADM

## 2024-02-08 RX ORDER — MISOPROSTOL 200 UG/1
800 TABLET ORAL ONCE AS NEEDED
Status: DISCONTINUED | OUTPATIENT
Start: 2024-02-08 | End: 2024-02-10 | Stop reason: HOSPADM

## 2024-02-08 RX ADMIN — LIDOCAINE HYDROCHLORIDE,EPINEPHRINE BITARTRATE 10 ML: 20; .005 INJECTION, SOLUTION EPIDURAL; INFILTRATION; INTRACAUDAL; PERINEURAL at 08:13

## 2024-02-08 RX ADMIN — KETOROLAC TROMETHAMINE 30 MG: 30 INJECTION, SOLUTION INTRAMUSCULAR; INTRAVENOUS at 21:07

## 2024-02-08 RX ADMIN — Medication 160 MCG: at 08:25

## 2024-02-08 RX ADMIN — Medication 5 ML: at 07:50

## 2024-02-08 RX ADMIN — METHYLERGONOVINE MALEATE 0.2 MG: 0.2 INJECTION, SOLUTION INTRAMUSCULAR; INTRAVENOUS at 08:29

## 2024-02-08 RX ADMIN — ACETAMINOPHEN 975 MG: 325 TABLET ORAL at 15:10

## 2024-02-08 RX ADMIN — SCOPALAMINE 1 PATCH: 1 PATCH, EXTENDED RELEASE TRANSDERMAL at 19:56

## 2024-02-08 RX ADMIN — FAMOTIDINE 20 MG: 10 INJECTION INTRAVENOUS at 08:15

## 2024-02-08 RX ADMIN — ENOXAPARIN SODIUM 40 MG: 100 INJECTION SUBCUTANEOUS at 21:07

## 2024-02-08 RX ADMIN — SODIUM CHLORIDE, POTASSIUM CHLORIDE, SODIUM LACTATE AND CALCIUM CHLORIDE: 600; 310; 30; 20 INJECTION, SOLUTION INTRAVENOUS at 08:26

## 2024-02-08 RX ADMIN — ONDANSETRON 4 MG: 2 INJECTION INTRAMUSCULAR; INTRAVENOUS at 04:54

## 2024-02-08 RX ADMIN — SODIUM CHLORIDE, POTASSIUM CHLORIDE, SODIUM LACTATE AND CALCIUM CHLORIDE 125 ML/HR: 600; 310; 30; 20 INJECTION, SOLUTION INTRAVENOUS at 12:30

## 2024-02-08 RX ADMIN — Medication 200 MCG: at 08:31

## 2024-02-08 RX ADMIN — ONDANSETRON 4 MG: 2 INJECTION INTRAMUSCULAR; INTRAVENOUS at 11:59

## 2024-02-08 RX ADMIN — Medication 160 MCG: at 08:26

## 2024-02-08 RX ADMIN — Medication 5 ML: at 07:52

## 2024-02-08 RX ADMIN — SODIUM CHLORIDE, POTASSIUM CHLORIDE, SODIUM LACTATE AND CALCIUM CHLORIDE 500 ML: 600; 310; 30; 20 INJECTION, SOLUTION INTRAVENOUS at 07:17

## 2024-02-08 RX ADMIN — ACETAMINOPHEN 975 MG: 325 TABLET ORAL at 21:06

## 2024-02-08 RX ADMIN — EPHEDRINE SULFATE 7.5 MG: 50 INJECTION, SOLUTION INTRAVENOUS at 08:40

## 2024-02-08 RX ADMIN — MORPHINE SULFATE 3 MG: 0.5 INJECTION EPIDURAL; INTRATHECAL; INTRAVENOUS at 08:53

## 2024-02-08 RX ADMIN — METOCLOPRAMIDE 10 MG: 5 INJECTION, SOLUTION INTRAMUSCULAR; INTRAVENOUS at 11:59

## 2024-02-08 RX ADMIN — CHLOROPROCAINE HYDROCHLORIDE 5 ML: 30 INJECTION, SOLUTION EPIDURAL; INFILTRATION; INTRACAUDAL; PERINEURAL at 08:19

## 2024-02-08 RX ADMIN — EPHEDRINE SULFATE 5 MG: 50 INJECTION, SOLUTION INTRAVENOUS at 08:53

## 2024-02-08 RX ADMIN — PENICILLIN G 3 MILLION UNITS: 3000000 INJECTION, SOLUTION INTRAVENOUS at 06:25

## 2024-02-08 RX ADMIN — PROMETHAZINE HYDROCHLORIDE 12.5 MG: 25 INJECTION INTRAMUSCULAR; INTRAVENOUS at 21:09

## 2024-02-08 RX ADMIN — KETOROLAC TROMETHAMINE 30 MG: 30 INJECTION, SOLUTION INTRAMUSCULAR; INTRAVENOUS at 15:10

## 2024-02-08 RX ADMIN — EPHEDRINE SULFATE 12.5 MG: 50 INJECTION, SOLUTION INTRAVENOUS at 08:46

## 2024-02-08 RX ADMIN — ONDANSETRON 4 MG: 2 INJECTION INTRAMUSCULAR; INTRAVENOUS at 08:15

## 2024-02-08 RX ADMIN — NORETHINDRONE AND ETHINYL ESTRADIOL 25 MG: KIT ORAL at 08:39

## 2024-02-08 RX ADMIN — Medication: at 02:11

## 2024-02-08 RX ADMIN — TERBUTALINE SULFATE 0.25 MG: 1 INJECTION SUBCUTANEOUS at 08:12

## 2024-02-08 RX ADMIN — PENICILLIN G 3 MILLION UNITS: 3000000 INJECTION, SOLUTION INTRAVENOUS at 02:43

## 2024-02-08 RX ADMIN — KETOROLAC TROMETHAMINE 30 MG: 30 INJECTION, SOLUTION INTRAMUSCULAR; INTRAVENOUS at 08:51

## 2024-02-08 RX ADMIN — AZITHROMYCIN 500 MG: 500 INJECTION, POWDER, LYOPHILIZED, FOR SOLUTION INTRAVENOUS at 08:17

## 2024-02-08 RX ADMIN — Medication 240 MCG: at 08:35

## 2024-02-08 RX ADMIN — SODIUM CHLORIDE, POTASSIUM CHLORIDE, SODIUM LACTATE AND CALCIUM CHLORIDE 125 ML/HR: 600; 310; 30; 20 INJECTION, SOLUTION INTRAVENOUS at 06:01

## 2024-02-08 RX ADMIN — CHLOROPROCAINE HYDROCHLORIDE 5 ML: 30 INJECTION, SOLUTION EPIDURAL; INFILTRATION; INTRACAUDAL; PERINEURAL at 08:17

## 2024-02-08 RX ADMIN — LEVOTHYROXINE SODIUM 150 MCG: 0.15 TABLET ORAL at 15:11

## 2024-02-08 RX ADMIN — CEFAZOLIN 2 G: 1 INJECTION, POWDER, FOR SOLUTION INTRAMUSCULAR; INTRAVENOUS at 08:17

## 2024-02-08 RX ADMIN — Medication 14 ML/HR: at 07:52

## 2024-02-08 SDOH — HEALTH STABILITY: MENTAL HEALTH: CURRENT SMOKER: 0

## 2024-02-08 ASSESSMENT — PAIN SCALES - GENERAL
PAINLEVEL_OUTOF10: 0 - NO PAIN
PAIN_LEVEL: 0
PAINLEVEL_OUTOF10: 0 - NO PAIN

## 2024-02-08 NOTE — ANESTHESIA PREPROCEDURE EVALUATION
Patient: Pauline Sun    Evaluation Method: In-person visit    Procedure Information    Anesthesia Start Date/Time: 02/08/24 0740  Procedure: Labor Analgesia         Relevant Problems   Anesthesia  No prior general anesthetics.  Only surgical hx was polypectomy.      Cardiovascular (within normal limits)      Endocrine   (+) Hypothyroidism      Pulmonary (within normal limits)      Hematology   (+) Anemia affecting pregnancy in second trimester       Clinical information reviewed:    Allergies  Meds               NPO Detail:  No data recorded     OB/Gyn Evaluation    Present Pregnancy    Patient is pregnant now.   Obstetric History            Physical Exam    Airway  Mallampati: II     Cardiovascular - normal exam     Dental - normal exam     Pulmonary - normal exam     Abdominal        Anesthesia Plan    History of general anesthesia?: no  History of complications of general anesthesia?: unknown/emergency    ASA 2     epidural   (Risks, benefits and alternatives of epidural discussed, including infection, bleeding, nerve damage, PDPH, ineffective epidural, and back pain.)  The patient is not a current smoker.    Anesthetic plan and risks discussed with patient and spouse.    Plan discussed with CAA.

## 2024-02-08 NOTE — SIGNIFICANT EVENT
AROM    Pt feeling ctx.    Cervical Exam  Dilation: 5  Effacement (%): 60  Fetal Station: -3  Method: Manual  OB Examiner: MD Darcy  Fetal Assessment  Movement: Present  Mode: External US  Baseline Fetal Heart Rate (bpm): 135 bpm  Baseline Classification: Normal  Variability: Moderate (Between 6 and 25 BPM)  Pattern: Accelerations  Pattern Observations: Baseline change at 0111      Contraction Frequency: 1-3    AROM for clear fluid.    A/P:  - s/p AROM  - Cont to uptitrate pit per protocol  - Cat I tracing  - NO for pain control    D/w Dr. Sanket Taveras MD PGY-2

## 2024-02-08 NOTE — CARE PLAN
The patient's goals for the shift include n/a    The clinical goals for the shift include Rest for a few hours

## 2024-02-08 NOTE — SIGNIFICANT EVENT
Decision for     Patient laid down from epidural and patient feeling increased pressure. While at bedside FHR noted to be in the 80s. SVE 9.5/100/0 with non reducible anterior lip. Pitocin paused. FSE applied. Maternal repositioning and fluid resuscitation attempted, however, FHR still in 80s. At 8:10, decision made for urgent  for terminal bradycardia remote from delivery. R/b/a discussed with patient and patient desired Anesthesia and nursing notified. Proceed to OR for urgent primary     Leanne Carlson MD PGY-1

## 2024-02-08 NOTE — L&D DELIVERY NOTE
OB Delivery Note  2024  Pauline Sun  38 y.o.   , Low Transverse        Gestational Age: 39w2d  /Para:   Quantitative Blood Loss: Admission to Discharge: 500 mL (2024  7:54 PM - 2024 11:44 AM)    Date: 2024  OR Location: [facility] OB    Name: Pauline Sun, : 1985, Age: 38 y.o., MRN: [id number], Sex: female    Diagnosis  Pre op:   IUP 39.2wga  History of abruption  Hashimotos  Terminal fetal bradycardia Post op:     same     Procedures    * OBGYN Delivery  Section    Surgeons      * Larry Sky - Primary    Resident/Fellow/Other Assistant:  Surgeon(s) and Role: Sarah Ellison PGY-4; Leanne Carlson PGY-1    Procedure Summary  Anesthesia: * No anesthesia type entered *  ASA: II  Anesthesia Staff: Anesthesiologist: Glendy Krause MD MPH  C-AA: FRANCESCO Chan; FRANCESCO Lujan  Estimated Blood Loss: 500mL  Intra-op Medications: * Intraprocedure medication information is unavailable because the case start and end events have not been set *           Anesthesia Record               Intraprocedure I/O Totals          Intake    Oxytocin Drip 0.00 mL    The total shown is the total volume documented since Anesthesia Start was filed.    lactated Ringer's infusion 1500.00 mL    fentaNYL 1.25 mcg/mL, bupivacaine 0.044%, EPINEPHrine [id number] epidural infusion 3.03 mL    Total Intake 1503.03 mL       Output    Urine 50 mL    Total Output 50 mL       Net    Net Volume 1453.03 mL          Specimen: placenta    Staff:   Circulator: Yenni Olson RN  Scrub Person: Cesilia Pritchett    Indications: urgent pCS for terminal fetal bradycardia remote from delivery    Findings: Normal appearing gravid uterus, fallopian tubes, and ovaries. Amniotic fluid clear, male infant in cephalic presentation    Procedure: Patient was taken to the OR where epidural anesthesia was redosed.  She was then placed in the dorsal supine position with a left lateral tilt. A michaels  catheter was placed, SCDs were applied, and a vaginal prep was performed. A pre-procedure time out was performed.  The patient was given prophylactic dose of IV antibiotics. She was then prepped with betadine splash and draped in the usual sterile fashion. A Pfannenstiel skin incision was made through the skin with the scalpel and then carried through the subcutaneous fat to the underlying fascial layer with sharp dissection. The fascia was incised at the midline with the scalpel, and the incision was extended bilaterally bluntly. The fascia was then dissected off the rectus muscle bluntly. The muscles were divided in the midline, the peritoneum was identified and then entered bluntly, and incision extended bilaterally with good visualization of bladder below. Bladder blade was inserted. A low transverse uterine incision was made with the scalpel, the uterine cavity was entered, and the hysterotomy was extended bilaterally with cephalocaudal stretching. The infant was delivered atraumatically, the cord was clamped and cut, and infant was handed off to the awaiting nursing staff. A cord segment and blood sample were collected. The placenta was then expressed. The uterus was exteriorized and cleared of all clot and debris. The uterine incision was repaired using a running stitch of 0-Vicryl. A second layer of the same suture was placed in an imbricating fashion. Uterine atony noted. Methergine given. Good hemostasis and improved uterine tone noted. The uterus was then placed back inside the pelvis, the gutters were cleared of all clots and debris. The hysterotomy was again evaluated and found to be hemostatic, and the underside of the fascia and bladder and the rectus muscles were also found to be hemostatic. The fascia was closed using a running stitch of 0-PDS. The subcutaneous layer was irrigated and small bleeding vessels were cauterized. the skin was closed with 4-0 Monocryl.  All counts were correct, the patient  tolerated the procedure well. Dr. Sky was present for all key portions of the procedure. The patient and infant were taken back to the recovery room in stable condition.      Corinne NicogaleJose Jvalentino [[id number]]      Labor Events    Sac identifier: Sac 1  Rupture date/time: 2024 0146  Rupture type: Artificial  Fluid color: Clear  Fluid odor: None  Labor type: Induced Onset of Labor  Labor allowed to proceed with plans for an attempted vaginal birth?: Yes  Induction: Oxytocin  Induction indications: Risk Reducing  Complications: Fetal Intolerance  Additional complications: Bradycardia found on measurement of baseline fetal heart rate       Labor Event Times    Labor onset date/time: 2024  Decision date/time (emergent ): 2024       Placenta    Placenta delivery date/time: 2024  Placenta removal: Expressed  Placenta appearance: Intact  Placenta disposition: pathology       Cord    Vessels: 3 vessels  Complications: None  Delayed cord clamping?: No  Cord blood disposition: Lab  Gases sent?: Yes  Stem cell collection (by provider): No       Lacerations    Episiotomy: None  Perineal laceration: None  Other lacerations?: No  Repair suture: None       Anesthesia    Method: Epidural       Operative Delivery    Forceps attempted?: No  Vacuum extractor attempted?: No       Shoulder Dystocia    Shoulder dystocia present?: No       [place] Delivery    Birth date/time: 2024 08:24:00  Delivery type: , Low Transverse   categorization: primary   priority: urgent  Indications for : Fetal Intolerance of Labor, Persistent Category 2 Tracing Remote from Delivery  Incision type: low transverse  Complications: Fetal Intolerance       Resuscitation    Method: Suctioning, Tactile stimulation       Apgars    Living status: Living  Apgar Component Scores:  1 min.:  5 min.:  10 min.:  15 min.:  20 min.:    Skin color:  1  1       Heart rate:  2  2       Reflex  irritability:  2  2       Muscle tone:  2  2       Respiratory effort:  2  2       Total:  9  9       Apgars assigned by: RADHA LIN       Delivery Providers    Delivering clinician: Larry Sky MD   Provider Role    Yenni Olson RN Delivery Nurse    Tiffany Padilla RN Nursery Nurse    Leanne Carlson MD Resident    Sarah Ellison MD Resident                 Leanne Carlson MD

## 2024-02-08 NOTE — DISCHARGE INSTRUCTIONS

## 2024-02-08 NOTE — ANESTHESIA PROCEDURE NOTES
Epidural Block    Patient location during procedure: OB  Start time: 2/8/2024 7:40 AM  End time: 2/8/2024 7:52 AM  Reason for block: labor analgesia  Staffing  Performed: FRANCESCO   Authorized by: FRANCESCO Lujan    Performed by: FRANCESCO Lujan    Preanesthetic Checklist  Completed: patient identified, IV checked, risks and benefits discussed, surgical consent, pre-op evaluation, timeout performed and sterile techniques followed  Block Timeout  RN/Licensed healthcare professional reads aloud to the Anesthesia provider and entire team: Patient identity, procedure with side and site, patient position, and as applicable the availability of implants/special equipment/special requirements.  Patient on coagulant treatment: no  Timeout performed at: 2/8/2024 7:41 AM  Block Placement  Patient position: sitting  Prep: ChloraPrep  Sterility prep: cap, gloves and mask  Sedation level: no sedation  Patient monitoring: blood pressure and continuous pulse oximetry  Approach: midline  Local numbing: lidocaine 1% to skin and subcutaneous tissues  Vertebral space: lumbar  Lumbar location: L3-L4  Epidural  Loss of resistance technique: saline  Guidance: landmark technique        Needle  Needle type: Tuohy   Needle gauge: 17  Needle length: 8.9cm  Needle insertion depth: 5 cm  Catheter type: multi-orifice  Catheter size: 19 G  Catheter at skin depth: 10 cm  Catheter securement method: clear occlusive dressing    Test dose: lidocaine 1.5% with epinephrine 1-to-200,000  Test dose: lidocaine 1.5% with epinephrine 1-to-200,000  Test dose result: no positive test dose    PCEA  Medication concentration used: 0.044% Bupivacaine with 1.25 mcg/mL Fentanyl and 1:[id number] Epinephrine  Dose (mL): 10  Lockout (minutes): 15  1-Hour Limit (boluses/hr): 4  Basal Rate: 11        Assessment  Sensory level: T10  Block outcome: patient comfortable  Number of attempts: 1  Events: no positive test dose  Procedure assessment: patient tolerated  procedure well with no immediate complications  Additional Notes  Epidural performed by Janak Kunz

## 2024-02-08 NOTE — ADDENDUM NOTE
Addendum  created 02/08/24 0925 by FRANCESCO Chan    Intraprocedure Meds edited, Intraprocedure Staff edited

## 2024-02-08 NOTE — H&P
Obstetrical Admission History and Physical     Pauline Sun is a 38 y.o.  at 39w1d. GHASSAN: 2024, by Last Menstrual Period. Estimated fetal weight: 3196 g (69 percentile). She has had prenatal care with Dr. Lucrecia Palumbo .    Chief Complaint: Induction of Labor.    Assessment/Plan      39yo G1 @ 39w1d by IUI dating admitted for term IOL    Labor management   - Admit to L&D, consented and scanned- cephalic  - Cervix is 3/50/-3; no indication for CRB/cyto at this time  - Will induce with Pitocin, monitor for cervical change, and AROM when appropriate  - Delivery plan: patient desires vaginal delivery, patient counseled on risks of labor, vaginal delivery, and possibility of C/S for varying maternal or fetal indications    Maternal well-being  - No epidural, per patient request. Patient prefers to start with Nitrous oxide and possible IV pain management, will consider trying epidural if these are ineffective   - T&S/CBC on admission    Fetal status  - CEFM; Cat 1 currently  - Fetus in cephalic presentation on admission  - EFW on  growth: 3196 g (69%), HC 72%, AC 3%    GBS positive  - Positive on 1/15 screen  - Will begin penicillin prophylaxis prior to AROM    Possible placental abruption in second trimester  - Was admitted inpatient for vaginal bleeding and contractions on 10/22 that raised concern for placental abruption  - received BMZ 10/22- during admission  - Subsequent growths wnl as above    Anemia during pregnancy  - Taking iron supplements, last Hbg 10.4 on 23    Hashimoto's disease  - Taking synthroid 150 mcg daily (increased from 100 mcg pre-pregnancy)  - Last TSH 2.65 wnl on     Options for delivery have been discussed with the patient and she elects for an induction of labor.  Cervical ripening with cytotec, cervidil, other prostaglandin agents has been discussed.  Induction of labor with pitocin, amniotomy, cytotec, and cervical balloon have been discussed in detail. The risks,  benefits, complications, alternatives, expected outcomes, potential problems during recuperation and recovery, and the risks of not performing the procedure were discussed with the patient. The patient stated understanding that the risks of delivery include, but are not limited to: death; reaction to medications; injury to bowel, bladder, ureters, uterus, cervix, vagina, and other pelvic and abdominal structures, infection; blood loss and possible need for transfusion; and potential need for surgery, including hysterectomy. The risks of injury to the infant during delivery were also discussed. All questions were answered. There was concurrence with the planned procedure, and the patient wanted to proceed.    Admit to inpatient status. I anticipate that this patient will require a stay exceeding at least 2 midnights for delivery and postpartum.  Induction of labor.  Management of pregnancy complications, as indicated.    Pt seen and d/w Dr. Sanket Taveras MD PGY-2      Subjective   Good fetal movement. C/O of occasional contractions. Denies leaking of fluid. Spotting on / after membrane sweep and early last week. No other VB.    Reason for Induction of Labor:  Pregnancy at 39 weeks or greater for induction    HPI  Pauline Sun is a 38 year-old  woman at 39w1d with history of Hashimotos admitted for a scheduled IOL. She conceived this pregnancy with her  via IUI (Clomid + Letrozole) in May 2023 after years of trying. Her pregnancy was largely uncomplicated, except for an episode of vaginal bleeding and cramping in the second trimester, which resulted in an admission 10/22 due to concern for placental abruption. Subsequent ultrasounds and NST were reassuring with no signs of abruption.     GBS positive on 1/15 screen. Positive for BV in 2023, symptoms resolved with treatment; repeat test  was negative. No history of HSV, Chlamydia, Gonorrhea. 1-hour glucose elevated at  172, but 3-hour glucose in normal range. Patient does not desire birth control following delivery.       Obstetrical History   OB History    Para Term  AB Living   1 0 0 0 0 0   SAB IAB Ectopic Multiple Live Births   0 0 0 0 0      # Outcome Date GA Lbr Donell/2nd Weight Sex Delivery Anes PTL Lv   1 Current               Obstetric Comments   AMA, 38 years old.  2023 03:51 PM - PU        NSTs at 36 weeks   2023 11:03 AM - LZ [id number] false        growth 30, 36 weeks   2023 11:02 AM - LZ [id number] false        sp RR cfDNA - BOY   2023 10:52 AM - LZ       A1C 2022 5.6%   2023 10:51 AM - LZ     BLOOD TYPE IS A+  2023 01:24 PM - MP     Clomid, Letrozole and IUI.  2023 03:52 PM - PU     Foresight negative  07/10/2023 12:47 PM - RS     Hashimoto's on Levo 100mcg qd         Past Medical History  Past Medical History:   Diagnosis Date    Breast nodule 10/05/2023    Encounter for fertility testing 2022    Fertility testing    Hirsutism     Keratosis pilaris 10/05/2023    Overweight 10/05/2023    Personal history of other diseases of the respiratory system     Personal history of asthma    Personal history of other diseases of the respiratory system     History of asthma    Personal history of other endocrine, nutritional and metabolic disease 2022    History of hypothyroidism    Personal history of other endocrine, nutritional and metabolic disease 2022    History of hypothyroidism    Primary female infertility 10/16/2023    Conceived on Letrozole  IUI    Varicella without complication     Varicella without complication        Past Surgical History   Past Surgical History:   Procedure Laterality Date    BREAST BIOPSY      POLYPECTOMY         Social History  Social History     Tobacco Use    Smoking status: Former     Types: Cigarettes     Quit date:      Years since quittin.1    Smokeless tobacco: Not on file   Substance Use Topics    Alcohol  use: Not Currently     Substance and Sexual Activity   Drug Use Never       Allergies  Patient has no known allergies.     Medications  Medications Prior to Admission   Medication Sig Dispense Refill Last Dose    aspirin 81 mg chewable tablet Chew 1 tablet (81 mg) once daily.       ferrous sulfate 324 mg (65 mg elemental iron) EC tablet (delayed release) Take 1 tablet (65 mg) by mouth every other day. 45 tablet 3     levothyroxine (Synthroid, Levoxyl) 150 mcg tablet TAKE 1 TABLET (150 MCG) BY MOUTH ONCE DAILY. 90 tablet 1     magnesium chloride 64 mg magnesium tablet Take by mouth.       magnesium lactate CR (Magtab) 84 mg ER tablet Take 1 tablet (84 mg) by mouth once daily.       prenatal vitamin calcium-iron-folic 27 mg iron- 1 mg tablet Take 1 tablet by mouth once daily.          Objective    Last Vitals  Temp Pulse Resp BP MAP O2 Sat   36.8 °C (98.2 °F) 81 18 100/62   97 %     Physical Examination  GENERAL: Examination reveals a well developed, well nourished, gravid female in no acute distress. She is alert and cooperative.  HEENT: PERRLA. External ears normal. Nose normal, no erythema or discharge. Mouth and throat clear.  NECK: supple, no significant adenopathy  ABDOMEN: soft, gravid, nontender, nondistended, no abnormal masses, no epigastric pain, fundus soft, nontender, size equal dates, FHT present  The fetus is in a vertex presentation, determined by ultrasound  VAGINA: normal appearing vagina with normal color and discharge and no lesions noted  CERVIX: Silva's score of 5 with cervix 3 cm dilated, 50 % effaced, -3 station, medium in consistency, middle in position. MEMBRANES are Intact  EXTREMITIES: no redness or tenderness in the calves or thighs, no edema  SKIN: normal coloration and turgor, no rashes  PSYCHOLOGICAL: awake and alert; oriented to person, place, and time    Fetal heart tracing shows baseline rate of 130 with moderate variability and some accelerations, with no decelerations.  [place]:  Irregular contractions    Lab Review    Lab Results   Component Value Date    WBC 10.1 02/07/2024    HGB 11.4 (L) 02/07/2024    HCT 33.8 (L) 02/07/2024     02/07/2024     GBS positive on 1/15/24  BV positive on 11/13, negative on 12/19

## 2024-02-08 NOTE — ANESTHESIA POSTPROCEDURE EVALUATION
Patient: Pauline Sun    Procedure Summary       Date: 24 Room / Location: [facility]    Anesthesia Start: 740 Anesthesia Stop: 915    Procedure: OBGYN Delivery  Section Diagnosis: (Fetal Intolerance of Labor)    Surgeons: Larry Sky MD Responsible Provider: Glendy Krause MD MPH    Anesthesia Type: regional ASA Status: 2            Anesthesia Type: regional    Vitals Value Taken Time   /57 24 0920   Temp 36.2 24 0920   Pulse 82 24   Resp 13 24   SpO2 99 24       Anesthesia Post Evaluation    Patient location during evaluation: bedside  Patient participation: complete - patient cannot participate  Level of consciousness: awake and alert  Pain score: 0  Pain management: adequate  Airway patency: patent  Cardiovascular status: acceptable  Respiratory status: acceptable  Hydration status: acceptable  Postoperative Nausea and Vomiting: none  Comments: Pauline Sun is a 38 y.o., , who had a , Low Transverse delivery on 2024 at 39w2d and is now POD1.    She had Neuraxial Anesthesia without immediate complications noted.       Pain well controlled    ---------------------------               24                      07         ---------------------------   BP:           95/57         Pulse:         66           Resp:          16           Temp:   36.9 °C (98.4 °F)   SpO2:          95%         ---------------------------    Neuraxial site assessed. No visible redness or swelling or drainage. Patient able to ambulate and move all extremities without difficulty. Able to void. No complaints of nausea/vomiting. Tolerating PO intake well. No s/sx of PDPH.     Anesthesia will sign off     AVA Restrepo-LISANDRA          No notable events documented.

## 2024-02-09 LAB
ERYTHROCYTE [DISTWIDTH] IN BLOOD BY AUTOMATED COUNT: 15.9 % (ref 11.5–14.5)
HCT VFR BLD AUTO: 29.6 % (ref 36–46)
HGB BLD-MCNC: 9.6 G/DL (ref 12–16)
MCH RBC QN AUTO: 28.2 PG (ref 26–34)
MCHC RBC AUTO-ENTMCNC: 32.4 G/DL (ref 32–36)
MCV RBC AUTO: 87 FL (ref 80–100)
NRBC BLD-RTO: 0 /100 WBCS (ref 0–0)
PLATELET # BLD AUTO: 218 X10*3/UL (ref 150–450)
RBC # BLD AUTO: 3.41 X10*6/UL (ref 4–5.2)
WBC # BLD AUTO: 10.8 X10*3/UL (ref 4.4–11.3)

## 2024-02-09 PROCEDURE — 1100000001 HC PRIVATE ROOM DAILY

## 2024-02-09 PROCEDURE — 2500000004 HC RX 250 GENERAL PHARMACY W/ HCPCS (ALT 636 FOR OP/ED): Performed by: STUDENT IN AN ORGANIZED HEALTH CARE EDUCATION/TRAINING PROGRAM

## 2024-02-09 PROCEDURE — 2500000001 HC RX 250 WO HCPCS SELF ADMINISTERED DRUGS (ALT 637 FOR MEDICARE OP): Performed by: STUDENT IN AN ORGANIZED HEALTH CARE EDUCATION/TRAINING PROGRAM

## 2024-02-09 PROCEDURE — 36415 COLL VENOUS BLD VENIPUNCTURE: CPT | Performed by: STUDENT IN AN ORGANIZED HEALTH CARE EDUCATION/TRAINING PROGRAM

## 2024-02-09 PROCEDURE — 85027 COMPLETE CBC AUTOMATED: CPT | Performed by: STUDENT IN AN ORGANIZED HEALTH CARE EDUCATION/TRAINING PROGRAM

## 2024-02-09 RX ADMIN — ACETAMINOPHEN 975 MG: 325 TABLET ORAL at 08:50

## 2024-02-09 RX ADMIN — ACETAMINOPHEN 975 MG: 325 TABLET ORAL at 20:34

## 2024-02-09 RX ADMIN — ACETAMINOPHEN 975 MG: 325 TABLET ORAL at 14:26

## 2024-02-09 RX ADMIN — KETOROLAC TROMETHAMINE 30 MG: 30 INJECTION, SOLUTION INTRAMUSCULAR; INTRAVENOUS at 03:08

## 2024-02-09 RX ADMIN — LEVOTHYROXINE SODIUM 150 MCG: 0.15 TABLET ORAL at 07:06

## 2024-02-09 RX ADMIN — IBUPROFEN 600 MG: 600 TABLET, FILM COATED ORAL at 08:50

## 2024-02-09 RX ADMIN — IBUPROFEN 600 MG: 600 TABLET, FILM COATED ORAL at 14:26

## 2024-02-09 RX ADMIN — IBUPROFEN 600 MG: 600 TABLET, FILM COATED ORAL at 20:34

## 2024-02-09 RX ADMIN — ENOXAPARIN SODIUM 40 MG: 100 INJECTION SUBCUTANEOUS at 20:34

## 2024-02-09 RX ADMIN — ACETAMINOPHEN 975 MG: 325 TABLET ORAL at 03:08

## 2024-02-09 ASSESSMENT — PAIN - FUNCTIONAL ASSESSMENT: PAIN_FUNCTIONAL_ASSESSMENT: 0-10

## 2024-02-09 ASSESSMENT — PAIN DESCRIPTION - DESCRIPTORS: DESCRIPTORS: DISCOMFORT;SORE

## 2024-02-09 ASSESSMENT — PAIN SCALES - GENERAL
PAINLEVEL_OUTOF10: 0 - NO PAIN
PAINLEVEL_OUTOF10: 3
PAINLEVEL_OUTOF10: 0 - NO PAIN
PAINLEVEL_OUTOF10: 3

## 2024-02-09 ASSESSMENT — PAIN DESCRIPTION - LOCATION: LOCATION: ABDOMEN

## 2024-02-09 NOTE — PROGRESS NOTES
Postpartum Progress Note    Assessment/Plan     Pauline Sun is a 38 y.o., , who initially presented for rrIOL  She had a , Low Transverse  delivery on 2024  at 39w2d and is now POD1.    #Post-op , Low Transverse   - for terminal bradycardia  - continue routine postoperative care  - pain well controlled on ERAS protocol  - Hg  11.4  -->  -> 9.6; acute blood loss anemia, asymptomatic, PO iron supplement on DC   - DVT Score: 6 SCDs and enoxaparin prophylactic dose daily while inpatient  - The patient's blood type is A POS. Rhogam is not indicated.    #Contraception  Defers contraception to primary OB/PP visit. We discussed pregnancy spacing of at least one year, abstaining from intercourse for 6wks, and the ability to become pregnant in the absence of regular menses. Pt verbalized understanding.     #Maternal Well-Being  - emotional support provided  - bonding with infant  -  feeding: breastfeeding/pumping encouraged; lactation consult prn     #Dispo  - anticipate d/c on POD #3 if meeting all post-op and postpartum milestones  - for f/u 2wks with Primary OB provider     Principal Problem:    Labor and delivery indication for care or intervention      Subjective   Her pain is well controlled with current medications  She is passing flatus  She is ambulating independently  She is tolerating a Adult diet Regular  She is voiding spontaneously  She reports no breast or nursing problems  She denies emotional concerns today     Pt endorses feeling relieved that everything turned out OK. Denies complaints at this time. Had terrible N/V yesterday but is now much better. Scop patch in place.    Objective   Last Vitals:  Temp Pulse Resp BP MAP Pulse Ox   36.9 °C (98.4 °F) 79 16 93/58 (IVONNE Fuentes notified)   96 %     Vitals Min/Max Last 24 Hours:  Temp  Min: 36 °C (96.8 °F)  Max: 36.9 °C (98.4 °F)  Pulse  Min: 60  Max: 79  Resp  Min: 16  Max: 18  BP  Min: 85/50  Max:  103/58    Intake/Output:     Intake/Output Summary (Last 24 hours) at 2/9/2024 1503  Last data filed at 2/9/2024 1300  Gross per 24 hour   Intake 2346 ml   Output 1975 ml   Net 371 ml       Physical Exam:  General: well appearing, well nourished, postpartum  Obstetric: fundus firm below umbilicus, lochia light  Skin: Warm, dry; no rashes/lesions/erythema  Breast: No masses, nipple discharge  Neuro: A/Ox3, no gross motor deficit   GI: no distension, appropriately tender, soft, +BS  Respiratory: Even and unlabored on RA, LSCTA BL  Cardiovascular: 2+ BLE edema; No erythema, warmth  Psych: appropriate mood and affect    Lab Data:  Lab Results   Component Value Date    WBC 10.8 02/09/2024    HGB 9.6 (L) 02/09/2024    HCT 29.6 (L) 02/09/2024     02/09/2024

## 2024-02-09 NOTE — LACTATION NOTE
Lactation Consultant Note  Lactation Consultation  Reason for Consult: Initial assessment  Consultant Name: Maame Garcia RN, IBCLC    Maternal Information  Has mother  before?: No  Infant to breast within first 2 hours of birth?: Yes  Exclusive Pump and Bottle Feed: No    Maternal Assessment  Breast Assessment: Medium, Symmetrical, Soft  Nipple Assessment: Intact, Short, Erect with stimulation, Erect  Areola Assessment: Normal    Infant Assessment  Infant Behavior: Awake, Rooting response, Feeding cues observed, Readiness to feed, Content after feeding  Infant Assessment: Good cupping of tongue    Feeding Assessment  Nutrition Source: Breastmilk  Feeding Method: Nursing at the breast  Feeding Position: Cross - cradle, Football/seated, Skin to skin, Both sides, Mother needs assistance with latch/positioning, Nose lightly touching breast, Chin tucked into breast  Suck/Feeding: Sustained  Latch Assessment: Minimal audible swallowing, Minimal assistance is needed, Instructed on deep latch, Eagerly grasped on to latch, Areolar attachment, Optimal angle of mouth opening, Deep latch obtained, Flanged lips, Sucks with long jaw movement    LATCH TOOL  Latch: Grasps breast, tongue down, lips flanged, rhythmic sucking  Audible Swallowing: Spontaneous and intermittent (24 hours old)  Type of Nipple: Everted (After stimulation)  Comfort (Breast/Nipple): Soft/non-tender  Hold (Positioning): Minimal assist, teach one side, mother does other, staff holds  LATCH Score: 9    Breast Pump       Other OB Lactation Tools       Patient Follow-up  Inpatient Lactation Follow-up Needed : Yes    Other OB Lactation Documentation  Maternal Risk Factors: Age over 30, primiparity,  delivery    Recommendations/Summary  Entered room prior to mom beginning feeding. Was able to help mom get baby positioned skin to skin in cross cradle hold on her right breast. Showed mom how to position baby with arms on either side of her breast,  belly in towards mom, with ear, shoulder, and hip in a line, with nose even with mom's nipple.  Also showed mom how to sandwich her areola and tap her nipple to baby's top lip, wait for baby's mouth to open wide, then pull baby on for a deep latch.   Baby latched eagerly, areolar attachment and rhythmic sucking and long jaw movements. Mom reported a comfortable latch with strong pulling sensation.      Reviewed recommended length and duration of feeding intervals, the process of milk production and removal, and what to expect from baby in terms of feeding behaviors.     Mom has a breast pump for home use, and outpatient lactation information was given.

## 2024-02-09 NOTE — CARE PLAN
Problem: Postpartum  Goal: No s/sx infection  Outcome: Progressing  Goal: No s/sx of hemorrhage  Outcome: Progressing     Problem: Pain - Adult  Goal: Verbalizes/displays adequate comfort level or baseline comfort level  Outcome: Progressing     Problem: Safety - Adult  Goal: Free from fall injury  Outcome: Progressing   The patient's goals for the shift include normal vital signs this shift    The clinical goals for the shift include void s/p michaels

## 2024-02-10 VITALS
RESPIRATION RATE: 16 BRPM | OXYGEN SATURATION: 96 % | TEMPERATURE: 99 F | HEART RATE: 64 BPM | WEIGHT: 189.6 LBS | DIASTOLIC BLOOD PRESSURE: 64 MMHG | SYSTOLIC BLOOD PRESSURE: 113 MMHG | BODY MASS INDEX: 32.54 KG/M2

## 2024-02-10 PROBLEM — D62 ACUTE BLOOD LOSS ANEMIA: Status: ACTIVE | Noted: 2024-02-10

## 2024-02-10 PROCEDURE — 2500000001 HC RX 250 WO HCPCS SELF ADMINISTERED DRUGS (ALT 637 FOR MEDICARE OP): Performed by: STUDENT IN AN ORGANIZED HEALTH CARE EDUCATION/TRAINING PROGRAM

## 2024-02-10 RX ADMIN — IBUPROFEN 600 MG: 600 TABLET, FILM COATED ORAL at 03:03

## 2024-02-10 RX ADMIN — LEVOTHYROXINE SODIUM 150 MCG: 0.15 TABLET ORAL at 08:13

## 2024-02-10 RX ADMIN — ACETAMINOPHEN 975 MG: 325 TABLET ORAL at 09:36

## 2024-02-10 RX ADMIN — IBUPROFEN 600 MG: 600 TABLET, FILM COATED ORAL at 09:36

## 2024-02-10 RX ADMIN — ACETAMINOPHEN 975 MG: 325 TABLET ORAL at 03:03

## 2024-02-10 ASSESSMENT — PAIN SCALES - GENERAL
PAINLEVEL_OUTOF10: 2
PAINLEVEL_OUTOF10: 3

## 2024-02-10 ASSESSMENT — PAIN DESCRIPTION - DESCRIPTORS: DESCRIPTORS: DISCOMFORT;SORE

## 2024-02-10 ASSESSMENT — PAIN - FUNCTIONAL ASSESSMENT: PAIN_FUNCTIONAL_ASSESSMENT: 0-10

## 2024-02-10 NOTE — DISCHARGE SUMMARY
Discharge Summary    Admission Date: 2024  Discharge Date: 02/10/24  Discharge Diagnosis:  delivery delivered     Patient Active Problem List   Diagnosis    Hypothyroidism    Multigravida of advanced maternal age in third trimester    Placental abruption in second trimester    Anemia affecting pregnancy in second trimester    Pregnancy resulting from assisted reproductive technology in third trimester    Elevated glucose tolerance test    Supervision of high risk pregnancy in third trimester     delivery delivered    Acute blood loss anemia       Hospital Course  Pauline Sun is a 38 y.o.,     Initially presented for: rrIOL urgent pLTCS in s/o terminal fetal vance    Admission Date: 2024    Delivery Date: 2024  8:24 AM     Delivery type: , Low Transverse      GA at delivery: 39w2d    Outcome: Living     Anesthesia during delivery: Epidural     Intrapartum complications: Fetal Intolerance     Feeding method: Breastfeeding Status: Yes    Contraception: Defers contraception to primary OB/PP visit. We discussed pregnancy spacing of at least one year, abstaining from intercourse for 6wks, and the ability to become pregnant in the absence of regular menses. Pt verbalized understanding.     Rhogam: The patient's blood type is A POS. Rhogam is not indicated.     Now postpartum day: 2.    Hospital course n/f:  - Hg  11.4  -->  s/p methergine -> 9.6; acute blood loss anemia, asymptomatic, PO iron supplement on DC  (has at home, no rx needed). Pt desires DC today.    PP course otherwise uneventful.  Meeting all postpartum milestones- ambulating independently, passing flatus, tolerating PO intake, lochia light, voiding spontaneously, and pain well controlled with PO meds. Has not used oxycodone and declines homegoing rx. Declines motrin and tylenol rxs as well.    Dispo  OK for DC today per pt preference  2 week incision check and 6wk postpartum follow-up.     Pertinent  Physical Exam At Time of Discharge  General: well appearing, well nourished, postpartum  Obstetric: fundus firm below umbilicus, lochia light; Pfannenstiel incision: clean, dry, well approximated, open to air, negative discharge/warmth/erythema   Skin: Warm, dry; no rashes/lesions/erythema  Breast: No masses, nipple discharge  Neuro: A/Ox3, conversational, no gross motor deficit   GI: no distension, appropriately tender, soft, +BS  Respiratory: Even and unlabored on RA, LSCTA BL  Cardiovascular: 3+ BLE edema; No erythema, warmth  Psych: appropriate mood and affect       Your medication list        CONTINUE taking these medications        Instructions Last Dose Given Next Dose Due   ferrous sulfate 324 mg (65 mg elemental iron) EC tablet (delayed release)      Take 1 tablet (65 mg) by mouth every other day.       levothyroxine 150 mcg tablet  Commonly known as: Synthroid, Levoxyl      TAKE 1 TABLET (150 MCG) BY MOUTH ONCE DAILY.       magnesium chloride 64 mg magnesium tablet           magnesium lactate CR 84 mg ER tablet  Commonly known as: Magtab           prenatal vitamin calcium-iron-folic 27 mg iron- 1 mg tablet                  STOP taking these medications      aspirin 81 mg chewable tablet                   Outpatient Follow-Up  No future appointments.    Fifi Bautista, APRN-CNP

## 2024-02-10 NOTE — LACTATION NOTE
Lactation Consultant Note  Lactation Consultation  Reason for Consult: Follow-up assessment  Consultant Name: ALYSON Odom    Maternal Information  Has mother  before?: No  Infant to breast within first 2 hours of birth?: Yes  Exclusive Pump and Bottle Feed: No    Maternal Assessment  Breast Assessment: Medium, Compressible, Symmetrical, Filling  Nipple Assessment: Bruised, Sore  Alterations in Nipple Condition: Stage I - pain or irritation with no skin break down  Areola Assessment: Bruised    Infant Assessment  Infant Behavior: Awake, Readiness to feed, Feeding cues observed  Infant Assessment: Good cupping of tongue    Feeding Assessment  Nutrition Source: Breastmilk  Feeding Method: Nursing at the breast  Feeding Position: Baby led, Cross - cradle, Skin to skin, Mother demonstrates good positioning, Nipple to nose, Both sides, Football/seated  Suck/Feeding: Sustained, Baby led rhythmically, Audible swallowing  Latch Assessment: Eagerly grasped on to latch, Areolar attachment, Instructed on deep latch, Latch achieved after repeated attempts, Wide open mouth < 160, Sucking and swallowing, Comfortable latch    LATCH TOOL  Latch: Grasps breast, tongue down, lips flanged, rhythmic sucking  Audible Swallowing: Spontaneous and intermittent (24 hours old)  Type of Nipple: Everted (After stimulation)  Comfort (Breast/Nipple): Filling, red/small blisters/bruises, mild/moderate discomfort  Hold (Positioning): No assist from staff, mother able to position/hold infant  LATCH Score: 9    Breast Pump       Other OB Lactation Tools       Patient Follow-up  Inpatient Lactation Follow-up Needed : No  Lactation Professional - OK to Discharge: Yes    Other OB Lactation Documentation       Recommendations/Summary  Reviewed discharge instructions, including feeding frequency, output, outpatient support, and engorgement management. All questions answered at this time.

## 2024-02-10 NOTE — CARE PLAN
Problem: Postpartum  Goal: No s/sx infection  Outcome: Met  Goal: No s/sx of hemorrhage  Outcome: Met     Problem: Pain - Adult  Goal: Verbalizes/displays adequate comfort level or baseline comfort level  Outcome: Met     Problem: Safety - Adult  Goal: Free from fall injury  Outcome: Met   The patient's goals for the shift include Continue to bond with baby and prepare for D/C    The clinical goals for the shift include discharge

## 2024-02-12 ENCOUNTER — APPOINTMENT (OUTPATIENT)
Dept: OBSTETRICS AND GYNECOLOGY | Facility: CLINIC | Age: 39
End: 2024-02-12
Payer: COMMERCIAL

## 2024-02-14 LAB
LABORATORY COMMENT REPORT: NORMAL
PATH REPORT.FINAL DX SPEC: NORMAL
PATH REPORT.GROSS SPEC: NORMAL
PATH REPORT.RELEVANT HX SPEC: NORMAL
PATH REPORT.TOTAL CANCER: NORMAL

## 2024-02-21 ENCOUNTER — APPOINTMENT (OUTPATIENT)
Dept: OBSTETRICS AND GYNECOLOGY | Facility: CLINIC | Age: 39
End: 2024-02-21
Payer: COMMERCIAL

## 2024-03-11 ENCOUNTER — POSTPARTUM VISIT (OUTPATIENT)
Dept: OBSTETRICS AND GYNECOLOGY | Facility: CLINIC | Age: 39
End: 2024-03-11
Payer: COMMERCIAL

## 2024-03-11 VITALS
DIASTOLIC BLOOD PRESSURE: 60 MMHG | SYSTOLIC BLOOD PRESSURE: 91 MMHG | BODY MASS INDEX: 27.66 KG/M2 | HEIGHT: 64 IN | WEIGHT: 162 LBS

## 2024-03-11 DIAGNOSIS — E03.8 HYPOTHYROIDISM DUE TO HASHIMOTO'S THYROIDITIS: ICD-10-CM

## 2024-03-11 DIAGNOSIS — E06.3 HYPOTHYROIDISM DUE TO HASHIMOTO'S THYROIDITIS: ICD-10-CM

## 2024-03-11 PROCEDURE — 0503F POSTPARTUM CARE VISIT: CPT | Performed by: ADVANCED PRACTICE MIDWIFE

## 2024-03-11 NOTE — PROGRESS NOTES
Plan    Advised to call office for breast complaints, abnormal bleeding, mood changes, or other concerning symptoms.   Cleared to resume normal activity as desired  yes    Problem List Items Addressed This Visit    None  Visit Diagnoses         Codes    Routine postpartum follow-up    -  Primary Z39.2            AVA Kapoor-MAEGAN    Subjective   38 y.o.  presenting for postpartum follow-up     Delivery Date: 24  Gestational Age: <None>   Type of Delivery: , Low Transverse      Pregnancy Problems (from 23 to present)       Problem Noted Resolved     delivery delivered 2024 by Kaye Taveras MD No    Priority:  Medium      Supervision of high risk pregnancy in third trimester 2024 by Lucrecia Palumbo MD No    Priority:  Medium      Anemia affecting pregnancy in second trimester 2023 by Lucrecia Palumbo MD No    Priority:  Medium      Overview Addendum 2023  3:02 PM by Poppy Crawford MD     On Feosol daily for Hgb 10 during abruption admission.  23 Normal iron levels, hemoglobin  improving. No bleeding.         Pregnancy resulting from assisted reproductive technology in third trimester 2023 by Lucrecia Palumbo MD No    Priority:  Medium      Overview Signed 2023 10:06 AM by Lucrecia Palumbo MD     CLOMID, LETROZOLE AND IUI         Placental abruption in second trimester 10/29/2023 by AVA Rivera-CNP No    Priority:  Medium      Overview Addendum 2023 10:04 AM by Lucrecia Palumbo MD     - Admitted 10/22 - 10/24 for ctx, bleeding  - Cx never changed from closed  - US 10/23 without evidence of abruption  - Serial abruption labs neg  - Received BMZ 10/22-23          Multigravida of advanced maternal age in third trimester 10/16/2023 by AVA Weiner-CNM No    Priority:  Medium      Overview Addendum 1/15/2024  9:27 AM by Lucrecia Palumbo MD     Growth scans 30, 36 weeks  Weekly  testing @ 36 weeks  bASA 162mg  daily  RR NIPS - BOY         Hypothyroidism 10/5/2023 by Irasema Colvin, IVONNE No    Priority:  Medium      Overview Addendum 1/15/2024  9:27 AM by Lucrecia Palumbo MD     -Last TSH 7/6 wnl   -On synthroid 100mcg daily --> 125mcg mid Nov 2023 --> 150mcg daily Dec 2023  - LFTs at goal Jan 2024                 Concerns: would like tsh drawn    Pain: controlled  Lacerations:  incision LTCS  Lochia: stopped  Sexual Intimacy: No  Contraceptive Method: None  Feeding Method: She is breast feeding exclusively. no breast or nursing problems  Lactation Consult Needed?: No    Birth Trauma: No  Bonding with Baby: well with baby boy, [unfilled]   Mood:   Postpartum Depression: Not on file       Last pap: 5/22  Physical Exam  Constitutional:       Appearance: Normal appearance.   HENT:      Head: Atraumatic.   Cardiovascular:      Rate and Rhythm: Regular rhythm.      Pulses: Normal pulses.   Musculoskeletal:         General: Normal range of motion.      Cervical back: Normal range of motion.   Neurological:      Mental Status: She is alert.   Skin:     General: Skin is warm and dry.   Psychiatric:         Mood and Affect: Mood normal.         Behavior: Behavior normal.   Vitals reviewed.

## 2024-03-12 ENCOUNTER — LAB (OUTPATIENT)
Dept: LAB | Facility: LAB | Age: 39
End: 2024-03-12
Payer: COMMERCIAL

## 2024-03-12 DIAGNOSIS — E03.8 HYPOTHYROIDISM DUE TO HASHIMOTO'S THYROIDITIS: ICD-10-CM

## 2024-03-12 DIAGNOSIS — E06.3 HYPOTHYROIDISM DUE TO HASHIMOTO'S THYROIDITIS: ICD-10-CM

## 2024-03-12 LAB
T4 FREE SERPL-MCNC: 1.44 NG/DL (ref 0.61–1.12)
TSH SERPL-ACNC: 0.02 MIU/L (ref 0.44–3.98)

## 2024-03-12 PROCEDURE — 36415 COLL VENOUS BLD VENIPUNCTURE: CPT

## 2024-03-12 PROCEDURE — 84443 ASSAY THYROID STIM HORMONE: CPT

## 2024-03-12 PROCEDURE — 84439 ASSAY OF FREE THYROXINE: CPT

## 2024-03-19 RX ORDER — LEVOTHYROXINE SODIUM 125 UG/1
125 TABLET ORAL
Qty: 30 TABLET | Refills: 11 | Status: SHIPPED | OUTPATIENT
Start: 2024-03-19 | End: 2024-06-04 | Stop reason: WASHOUT

## 2024-04-12 ENCOUNTER — TELEPHONE (OUTPATIENT)
Dept: OBSTETRICS AND GYNECOLOGY | Facility: CLINIC | Age: 39
End: 2024-04-12

## 2024-04-12 ENCOUNTER — TELEPHONE (OUTPATIENT)
Dept: OBSTETRICS AND GYNECOLOGY | Facility: HOSPITAL | Age: 39
End: 2024-04-12

## 2024-04-12 ENCOUNTER — LAB (OUTPATIENT)
Dept: LAB | Facility: LAB | Age: 39
End: 2024-04-12
Payer: COMMERCIAL

## 2024-04-12 DIAGNOSIS — E03.9 HYPOTHYROIDISM, UNSPECIFIED TYPE: Primary | ICD-10-CM

## 2024-04-12 DIAGNOSIS — E06.3 HYPOTHYROIDISM DUE TO HASHIMOTO'S THYROIDITIS: ICD-10-CM

## 2024-04-12 DIAGNOSIS — E03.8 HYPOTHYROIDISM DUE TO HASHIMOTO'S THYROIDITIS: ICD-10-CM

## 2024-04-12 LAB
T4 FREE SERPL-MCNC: 1.12 NG/DL (ref 0.61–1.12)
TSH SERPL-ACNC: 0.01 MIU/L (ref 0.44–3.98)

## 2024-04-12 PROCEDURE — 36415 COLL VENOUS BLD VENIPUNCTURE: CPT

## 2024-04-12 PROCEDURE — 84443 ASSAY THYROID STIM HORMONE: CPT

## 2024-04-12 PROCEDURE — 84439 ASSAY OF FREE THYROXINE: CPT

## 2024-04-12 RX ORDER — LEVOTHYROXINE SODIUM 100 UG/1
100 TABLET ORAL
Qty: 30 TABLET | Refills: 0 | Status: SHIPPED | OUTPATIENT
Start: 2024-04-12 | End: 2024-06-04

## 2024-04-12 NOTE — TELEPHONE ENCOUNTER
Patient was notified of TSH results and decrease to 100mcg of Synthroid.  Repeat blood work in 4 to 6 weeks.  Patient has 6/17/24 appointment with endocrinology.

## 2024-04-15 NOTE — TELEPHONE ENCOUNTER
Tsh remains suppressed after levothyroxine had been lowered to 125 mcg daily. Lowered to 100 mcg daily and referral to endocrinology again placed. Patient to be managed by endocrinology. Repeat labs ordered to be done in 4-6 weeks.

## 2024-05-16 ENCOUNTER — LAB (OUTPATIENT)
Dept: LAB | Facility: LAB | Age: 39
End: 2024-05-16
Payer: COMMERCIAL

## 2024-05-16 DIAGNOSIS — E03.9 HYPOTHYROIDISM, UNSPECIFIED TYPE: ICD-10-CM

## 2024-05-16 LAB
T4 FREE SERPL-MCNC: 0.67 NG/DL (ref 0.61–1.12)
TSH SERPL-ACNC: 4.23 MIU/L (ref 0.44–3.98)

## 2024-05-16 PROCEDURE — 84439 ASSAY OF FREE THYROXINE: CPT

## 2024-05-16 PROCEDURE — 84443 ASSAY THYROID STIM HORMONE: CPT

## 2024-05-16 PROCEDURE — 36415 COLL VENOUS BLD VENIPUNCTURE: CPT

## 2024-06-04 ENCOUNTER — OFFICE VISIT (OUTPATIENT)
Dept: OBSTETRICS AND GYNECOLOGY | Facility: CLINIC | Age: 39
End: 2024-06-04
Payer: COMMERCIAL

## 2024-06-04 VITALS
DIASTOLIC BLOOD PRESSURE: 60 MMHG | BODY MASS INDEX: 26.8 KG/M2 | HEIGHT: 64 IN | SYSTOLIC BLOOD PRESSURE: 102 MMHG | WEIGHT: 157 LBS

## 2024-06-04 DIAGNOSIS — Z01.419 WELL WOMAN EXAM: Primary | ICD-10-CM

## 2024-06-04 PROCEDURE — 99396 PREV VISIT EST AGE 40-64: CPT | Performed by: ADVANCED PRACTICE MIDWIFE

## 2024-06-04 PROCEDURE — 1036F TOBACCO NON-USER: CPT | Performed by: ADVANCED PRACTICE MIDWIFE

## 2024-06-04 ASSESSMENT — COLUMBIA-SUICIDE SEVERITY RATING SCALE - C-SSRS
1. IN THE PAST MONTH, HAVE YOU WISHED YOU WERE DEAD OR WISHED YOU COULD GO TO SLEEP AND NOT WAKE UP?: NO
6. HAVE YOU EVER DONE ANYTHING, STARTED TO DO ANYTHING, OR PREPARED TO DO ANYTHING TO END YOUR LIFE?: NO
2. HAVE YOU ACTUALLY HAD ANY THOUGHTS OF KILLING YOURSELF?: NO

## 2024-06-04 ASSESSMENT — PATIENT HEALTH QUESTIONNAIRE - PHQ9
1. LITTLE INTEREST OR PLEASURE IN DOING THINGS: NOT AT ALL
SUM OF ALL RESPONSES TO PHQ9 QUESTIONS 1 AND 2: 0
2. FEELING DOWN, DEPRESSED OR HOPELESS: NOT AT ALL

## 2024-06-04 NOTE — PROGRESS NOTES
"Assessment/Plan   Problem List Items Addressed This Visit    None      Svitlana Wick, APRN-CNM     Subjective   Pauline Sun is a 38 y.o. female who is here for a routine exam.     Concerns today:  none    No LMP recorded.   Periods are  no period at this time - is breastfeeding ,  Sexual Activity: sexually active, male partners; Patient reports 1 partners in the last 12 months.  Pain with intercourse? No   Loss of desire? No   Able to have an orgasm? Yes     History of prior STI: none    Current contraception: abstinence and condoms    Last pap:   History of abnormal Pap smear: no  Family history of uterine or ovarian cancer: no      Family history of breast cancer: yes - paternal aunt  Menstrual History:  OB History          1    Para   1    Term   1       0    AB   0    Living   1         SAB   0    IAB   0    Ectopic   0    Multiple   0    Live Births   1           Obstetric Comments   AMA, 38 years old.  2023 03:51 PM - PU      NSTs at 36 weeks   2023 11:03 AM - LZ [id number] false      growth 30, 36 weeks   2023 11:02 AM - LZ [id number] false      sp RR cfDNA - BOY   2023 10:52 AM - LZ     A1C Sept  5.6%    10:51 AM - LZ    BLOOD TYPE IS A+  2023 01:24 PM - MP    Clomid, Letrozole and IUI.  2023 03:52 PM - PU    Foresight negative  07/10/2023 12:47 PM - RS    Hashimoto's on Levo 100mcg qd                  Menarche age: 11  No LMP recorded.       Objective   /60   Ht 1.626 m (5' 4\")   Wt 71.2 kg (157 lb)   Breastfeeding Yes   BMI 26.95 kg/m²   OBGyn Exam    "

## 2024-06-12 ENCOUNTER — APPOINTMENT (OUTPATIENT)
Dept: OBSTETRICS AND GYNECOLOGY | Facility: CLINIC | Age: 39
End: 2024-06-12
Payer: COMMERCIAL

## 2024-06-17 ENCOUNTER — LAB (OUTPATIENT)
Dept: LAB | Facility: LAB | Age: 39
End: 2024-06-17
Payer: COMMERCIAL

## 2024-06-17 ENCOUNTER — APPOINTMENT (OUTPATIENT)
Dept: ENDOCRINOLOGY | Facility: CLINIC | Age: 39
End: 2024-06-17
Payer: COMMERCIAL

## 2024-06-17 VITALS
RESPIRATION RATE: 16 BRPM | SYSTOLIC BLOOD PRESSURE: 110 MMHG | HEART RATE: 60 BPM | DIASTOLIC BLOOD PRESSURE: 58 MMHG | WEIGHT: 160 LBS | HEIGHT: 64 IN | BODY MASS INDEX: 27.31 KG/M2

## 2024-06-17 DIAGNOSIS — E06.3 HYPOTHYROIDISM DUE TO HASHIMOTO'S THYROIDITIS: ICD-10-CM

## 2024-06-17 DIAGNOSIS — E03.8 HYPOTHYROIDISM DUE TO HASHIMOTO'S THYROIDITIS: ICD-10-CM

## 2024-06-17 PROCEDURE — 84481 FREE ASSAY (FT-3): CPT

## 2024-06-17 PROCEDURE — 36415 COLL VENOUS BLD VENIPUNCTURE: CPT

## 2024-06-17 PROCEDURE — 99204 OFFICE O/P NEW MOD 45 MIN: CPT | Performed by: INTERNAL MEDICINE

## 2024-06-17 PROCEDURE — 84443 ASSAY THYROID STIM HORMONE: CPT

## 2024-06-17 PROCEDURE — 84439 ASSAY OF FREE THYROXINE: CPT

## 2024-06-17 PROCEDURE — 1036F TOBACCO NON-USER: CPT | Performed by: INTERNAL MEDICINE

## 2024-06-17 ASSESSMENT — ENCOUNTER SYMPTOMS
DIARRHEA: 0
CHILLS: 0
FEVER: 0
PALPITATIONS: 0
HEADACHES: 0
COUGH: 0
SHORTNESS OF BREATH: 0
FATIGUE: 0
NAUSEA: 0
VOMITING: 0

## 2024-06-17 NOTE — PATIENT INSTRUCTIONS
Blood work today  Adjustment based on levels  Follow-up in 4 months with blood work in between at the 2-month abhay  Please call or message with any concerns or questions

## 2024-06-17 NOTE — PROGRESS NOTES
Endocrinology New Patient Consult  Subjective   Patient ID: Pauline Sun is a 38 y.o. female who presents for Hypothyroidism. Pateint was referred by Svitlana Wick CNM.    PCP: Lucrecia Palumbo MD    HPI  38-year-old referred for evaluation of hypothyroidism.  Last year she was undergoing fertility evaluation was diagnosed with Hashimoto's.  She was started on thyroid medication and her requirements increased during subsequent pregnancy up to a maximum dose of 150 mcg at the end of .  She had her baby in February and since then her dose has been adjusted down several times.  Currently she is taking 100 mcg daily in the morning on an empty stomach.  She does have a family history of thyroid disease in her mother.  She is not menstruating and is still breast-feeding.  She is able to sleep on and off when the baby is sleeping.  She has had some brain fog that she attributes to her sleep disturbance.  She does have sometimes a sore throat but it exacerbated with recent allergy/pollen fluctuations.    Review of Systems   Constitutional:  Negative for chills, fatigue and fever.   Respiratory:  Negative for cough and shortness of breath.    Cardiovascular:  Negative for chest pain and palpitations.   Gastrointestinal:  Negative for diarrhea, nausea and vomiting.   Neurological:  Negative for headaches.       Patient Active Problem List   Diagnosis    Hypothyroidism    Multigravida of advanced maternal age in third trimester ([facility])    Placental abruption in second trimester ([facility])    Anemia affecting pregnancy in second trimester ([facility])    Pregnancy resulting from assisted reproductive technology in third trimester ([facility])    Elevated glucose tolerance test    Supervision of high risk pregnancy in third trimester ([facility])     delivery delivered ([facility])    Acute blood loss anemia       Past Medical History:   Diagnosis Date    Breast nodule 10/05/2023    Encounter for fertility testing  2022    Fertility testing    Hirsutism     Keratosis pilaris 10/05/2023    Overweight 10/05/2023    Personal history of other diseases of the respiratory system     Personal history of asthma    Personal history of other diseases of the respiratory system     History of asthma    Personal history of other endocrine, nutritional and metabolic disease 2022    History of hypothyroidism    Personal history of other endocrine, nutritional and metabolic disease 2022    History of hypothyroidism    Primary female infertility 10/16/2023    Conceived on Letrozole  IUI        Past Surgical History:   Procedure Laterality Date     SECTION, LOW TRANSVERSE  2024    POLYPECTOMY          Social History     Tobacco Use   Smoking Status Former    Current packs/day: 0.00    Types: Cigarettes    Quit date:     Years since quittin.4   Smokeless Tobacco Never      Social History     Substance and Sexual Activity   Alcohol Use Not Currently    Comment: socially      Marital status:   Employment: [id number]    Family History   Problem Relation Name Age of Onset    Colon cancer Father      Other (CARDIAC DISORDER) Maternal Grandmother      Diabetes Maternal Grandmother      Hypertension Maternal Grandmother      Heart attack Maternal Grandmother      Hyperlipidemia Maternal Grandmother          Home Meds:  Current Outpatient Medications   Medication Instructions    levothyroxine (SYNTHROID) 100 mcg, oral, Daily before breakfast    magnesium chloride 64 mg magnesium tablet oral    prenatal vitamin calcium-iron-folic 27 mg iron- 1 mg tablet 1 tablet, oral, Daily        No Known Allergies     Objective   Vitals:    24 1531   BP: 110/58   Pulse: 60   Resp: 16      Vitals:    24 1531   Weight: 72.6 kg (160 lb)      Body mass index is 27.46 kg/m².   Physical Exam  Constitutional:       Appearance: Normal appearance. She is overweight.   HENT:      Head: Normocephalic and atraumatic.    Neck:      Thyroid: No thyroid mass, thyromegaly or thyroid tenderness.   Cardiovascular:      Rate and Rhythm: Normal rate and regular rhythm.      Heart sounds: No murmur heard.     No gallop.   Pulmonary:      Effort: Pulmonary effort is normal.      Breath sounds: Normal breath sounds.   Abdominal:      Palpations: Abdomen is soft.      Comments: benign   Neurological:      General: No focal deficit present.      Mental Status: She is alert and oriented to person, place, and time.      Deep Tendon Reflexes: Reflexes are normal and symmetric.   Psychiatric:         Behavior: Behavior is cooperative.         Labs:  Lab Results   Component Value Date    HGBA1C 5.6 09/26/2022    LDLDIRECT 102 03/05/2020    TSH 4.23 (H) 05/16/2024    FREET4 0.67 05/16/2024      Lab Results   Component Value Date    THYROIDPAB >1000 (A) 01/17/2023        Assessment/Plan   Problem List Items Addressed This Visit       Hypothyroidism     38-year-old here for evaluation of hypothyroidism.  We discussed fluctuations in thyroid requirements during and after pregnancy we also discussed postpartum thyroiditis and possible contribution to erratic levels.  We discussed her most recent thyroid blood work a month ago that did show slightly low levels.  We will start over with a recheck today and adjust from there.  She has no plans on starting hormonal contraception so only fluctuations would be weight and absorption.  I encouraged her to continue to take it as she is now.  We will adjust her dose based on today's labs and follow-up in 4 months with blood work in between.  I encouraged her to call or message with any concerns or questions  Electronically signed by:  Josee Glaser MD 06/17/24  3:35 PM

## 2024-06-17 NOTE — LETTER
June 17, 2024     ARLEY Kapoor  [address]   [facility]  [facility] [address]    Patient: Pauline Sun   YOB: 1985   Date of Visit: 6/17/2024       Dear ARLEY Colon:    Thank you for referring Pauline Sun to me for evaluation. Below are my notes for this consultation.  If you have questions, please do not hesitate to call me. I look forward to following your patient along with you.       Sincerely,     Josee Glaser MD      CC: No Recipients  ______________________________________________________________________________________    Endocrinology New Patient Consult  Subjective  Patient ID: Pauline Sun is a 38 y.o. female who presents for Hypothyroidism. Pateint was referred by Svitlana Wick CNM.    PCP: Lucrecia Palumbo MD    HPI  38-year-old referred for evaluation of hypothyroidism.  Last year she was undergoing fertility evaluation was diagnosed with Hashimoto's.  She was started on thyroid medication and her requirements increased during subsequent pregnancy up to a maximum dose of 150 mcg at the end of 2023.  She had her baby in February and since then her dose has been adjusted down several times.  Currently she is taking 100 mcg daily in the morning on an empty stomach.  She does have a family history of thyroid disease in her mother.  She is not menstruating and is still breast-feeding.  She is able to sleep on and off when the baby is sleeping.  She has had some brain fog that she attributes to her sleep disturbance.  She does have sometimes a sore throat but it exacerbated with recent allergy/pollen fluctuations.    Review of Systems   Constitutional:  Negative for chills, fatigue and fever.   Respiratory:  Negative for cough and shortness of breath.    Cardiovascular:  Negative for chest pain and palpitations.   Gastrointestinal:  Negative for diarrhea, nausea and vomiting.   Neurological:   Negative for headaches.       Patient Active Problem List   Diagnosis   • Hypothyroidism   • Multigravida of advanced maternal age in third trimester ([facility])   • Placental abruption in second trimester ([facility])   • Anemia affecting pregnancy in second trimester ([facility])   • Pregnancy resulting from assisted reproductive technology in third trimester ([facility])   • Elevated glucose tolerance test   • Supervision of high risk pregnancy in third trimester ([facility])   •  delivery delivered ([facility])   • Acute blood loss anemia       Past Medical History:   Diagnosis Date   • Breast nodule 10/05/2023   • Encounter for fertility testing 2022    Fertility testing   • Hirsutism    • Keratosis pilaris 10/05/2023   • Overweight 10/05/2023   • Personal history of other diseases of the respiratory system     Personal history of asthma   • Personal history of other diseases of the respiratory system     History of asthma   • Personal history of other endocrine, nutritional and metabolic disease 2022    History of hypothyroidism   • Personal history of other endocrine, nutritional and metabolic disease 2022    History of hypothyroidism   • Primary female infertility 10/16/2023    Conceived on Letrozole  IUI        Past Surgical History:   Procedure Laterality Date   •  SECTION, LOW TRANSVERSE  2024   • POLYPECTOMY          Social History     Tobacco Use   Smoking Status Former   • Current packs/day: 0.00   • Types: Cigarettes   • Quit date:    • Years since quittin.4   Smokeless Tobacco Never      Social History     Substance and Sexual Activity   Alcohol Use Not Currently    Comment: socially      Marital status:   Employment: [employer]    Family History   Problem Relation Name Age of Onset   • Colon cancer Father     • Other (CARDIAC DISORDER) Maternal Grandmother     • Diabetes Maternal Grandmother     • Hypertension Maternal Grandmother     • Heart attack Maternal  Grandmother     • Hyperlipidemia Maternal Grandmother          Home Meds:  Current Outpatient Medications   Medication Instructions   • levothyroxine (SYNTHROID) 100 mcg, oral, Daily before breakfast   • magnesium chloride 64 mg magnesium tablet oral   • prenatal vitamin calcium-iron-folic 27 mg iron- 1 mg tablet 1 tablet, oral, Daily        No Known Allergies     Objective  Vitals:    06/17/24 1531   BP: 110/58   Pulse: 60   Resp: 16      Vitals:    06/17/24 1531   Weight: 72.6 kg (160 lb)      Body mass index is 27.46 kg/m².   Physical Exam  Constitutional:       Appearance: Normal appearance. She is overweight.   HENT:      Head: Normocephalic and atraumatic.   Neck:      Thyroid: No thyroid mass, thyromegaly or thyroid tenderness.   Cardiovascular:      Rate and Rhythm: Normal rate and regular rhythm.      Heart sounds: No murmur heard.     No gallop.   Pulmonary:      Effort: Pulmonary effort is normal.      Breath sounds: Normal breath sounds.   Abdominal:      Palpations: Abdomen is soft.      Comments: benign   Neurological:      General: No focal deficit present.      Mental Status: She is alert and oriented to person, place, and time.      Deep Tendon Reflexes: Reflexes are normal and symmetric.   Psychiatric:         Behavior: Behavior is cooperative.         Labs:  Lab Results   Component Value Date    HGBA1C 5.6 09/26/2022    LDLDIRECT 102 03/05/2020    TSH 4.23 (H) 05/16/2024    FREET4 0.67 05/16/2024      Lab Results   Component Value Date    THYROIDPAB >1000 (A) 01/17/2023        Assessment/Plan  Problem List Items Addressed This Visit       Hypothyroidism     38-year-old here for evaluation of hypothyroidism.  We discussed fluctuations in thyroid requirements during and after pregnancy we also discussed postpartum thyroiditis and possible contribution to erratic levels.  We discussed her most recent thyroid blood work a month ago that did show slightly low levels.  We will start over with a recheck  today and adjust from there.  She has no plans on starting hormonal contraception so only fluctuations would be weight and absorption.  I encouraged her to continue to take it as she is now.  We will adjust her dose based on today's labs and follow-up in 4 months with blood work in between.  I encouraged her to call or message with any concerns or questions  Electronically signed by:  Josee Glaser MD 06/17/24  3:35 PM

## 2024-06-18 DIAGNOSIS — E03.8 HYPOTHYROIDISM DUE TO HASHIMOTO'S THYROIDITIS: Primary | ICD-10-CM

## 2024-06-18 DIAGNOSIS — E06.3 HYPOTHYROIDISM DUE TO HASHIMOTO'S THYROIDITIS: Primary | ICD-10-CM

## 2024-06-18 DIAGNOSIS — E03.9 HYPOTHYROIDISM, UNSPECIFIED TYPE: ICD-10-CM

## 2024-06-18 LAB
T3FREE SERPL-MCNC: 2.8 PG/ML (ref 2.3–4.2)
T4 FREE SERPL-MCNC: 1.31 NG/DL (ref 0.78–1.48)
TSH SERPL-ACNC: 2.1 MIU/L (ref 0.44–3.98)

## 2024-06-18 RX ORDER — LEVOTHYROXINE SODIUM 100 UG/1
100 TABLET ORAL
Qty: 90 TABLET | Refills: 1 | Status: SHIPPED | OUTPATIENT
Start: 2024-06-18 | End: 2025-06-18

## 2024-08-15 ENCOUNTER — LAB (OUTPATIENT)
Dept: LAB | Facility: LAB | Age: 39
End: 2024-08-15
Payer: COMMERCIAL

## 2024-08-15 DIAGNOSIS — E03.8 HYPOTHYROIDISM DUE TO HASHIMOTO'S THYROIDITIS: ICD-10-CM

## 2024-08-15 DIAGNOSIS — E06.3 HYPOTHYROIDISM DUE TO HASHIMOTO'S THYROIDITIS: Primary | ICD-10-CM

## 2024-08-15 DIAGNOSIS — E03.8 HYPOTHYROIDISM DUE TO HASHIMOTO'S THYROIDITIS: Primary | ICD-10-CM

## 2024-08-15 DIAGNOSIS — E06.3 HYPOTHYROIDISM DUE TO HASHIMOTO'S THYROIDITIS: ICD-10-CM

## 2024-08-15 LAB
T3FREE SERPL-MCNC: 2.9 PG/ML (ref 2.3–4.2)
T4 FREE SERPL-MCNC: 0.81 NG/DL (ref 0.61–1.12)
TSH SERPL-ACNC: 5.72 MIU/L (ref 0.44–3.98)

## 2024-08-15 PROCEDURE — 84481 FREE ASSAY (FT-3): CPT

## 2024-08-15 PROCEDURE — 84439 ASSAY OF FREE THYROXINE: CPT

## 2024-08-15 PROCEDURE — 36415 COLL VENOUS BLD VENIPUNCTURE: CPT

## 2024-08-15 PROCEDURE — 84443 ASSAY THYROID STIM HORMONE: CPT

## 2024-10-14 ENCOUNTER — LAB (OUTPATIENT)
Dept: LAB | Facility: LAB | Age: 39
End: 2024-10-14
Payer: COMMERCIAL

## 2024-10-14 DIAGNOSIS — E06.3 HYPOTHYROIDISM DUE TO HASHIMOTO'S THYROIDITIS: ICD-10-CM

## 2024-10-14 LAB
T4 FREE SERPL-MCNC: 1.08 NG/DL (ref 0.61–1.12)
TSH SERPL-ACNC: 1.41 MIU/L (ref 0.44–3.98)

## 2024-10-14 PROCEDURE — 84481 FREE ASSAY (FT-3): CPT

## 2024-10-14 PROCEDURE — 84439 ASSAY OF FREE THYROXINE: CPT

## 2024-10-14 PROCEDURE — 36415 COLL VENOUS BLD VENIPUNCTURE: CPT

## 2024-10-14 PROCEDURE — 84443 ASSAY THYROID STIM HORMONE: CPT

## 2024-10-15 ENCOUNTER — TELEPHONE (OUTPATIENT)
Dept: PRIMARY CARE | Facility: CLINIC | Age: 39
End: 2024-10-15

## 2024-10-15 ENCOUNTER — APPOINTMENT (OUTPATIENT)
Dept: ENDOCRINOLOGY | Facility: CLINIC | Age: 39
End: 2024-10-15
Payer: COMMERCIAL

## 2024-10-15 VITALS
RESPIRATION RATE: 16 BRPM | HEART RATE: 76 BPM | DIASTOLIC BLOOD PRESSURE: 70 MMHG | WEIGHT: 164.4 LBS | HEIGHT: 64 IN | SYSTOLIC BLOOD PRESSURE: 120 MMHG | BODY MASS INDEX: 28.07 KG/M2

## 2024-10-15 DIAGNOSIS — E03.9 HYPOTHYROIDISM, UNSPECIFIED TYPE: ICD-10-CM

## 2024-10-15 LAB — T3FREE SERPL-MCNC: 2.9 PG/ML (ref 2.3–4.2)

## 2024-10-15 PROCEDURE — 3008F BODY MASS INDEX DOCD: CPT | Performed by: INTERNAL MEDICINE

## 2024-10-15 PROCEDURE — 99213 OFFICE O/P EST LOW 20 MIN: CPT | Performed by: INTERNAL MEDICINE

## 2024-10-15 PROCEDURE — 1036F TOBACCO NON-USER: CPT | Performed by: INTERNAL MEDICINE

## 2024-10-15 RX ORDER — AMOXICILLIN AND CLAVULANATE POTASSIUM 875; 125 MG/1; MG/1
1 TABLET, FILM COATED ORAL
COMMUNITY
Start: 2024-10-13

## 2024-10-15 RX ORDER — LEVOTHYROXINE SODIUM 112 UG/1
112 TABLET ORAL
Qty: 90 TABLET | Refills: 3 | Status: SHIPPED | OUTPATIENT
Start: 2024-10-15 | End: 2025-10-15

## 2024-10-15 ASSESSMENT — ENCOUNTER SYMPTOMS
DIARRHEA: 0
CHILLS: 0
FATIGUE: 0
PALPITATIONS: 0
HEADACHES: 0
FEVER: 0
COUGH: 0
VOMITING: 0
SHORTNESS OF BREATH: 0
NAUSEA: 0

## 2024-10-15 NOTE — PATIENT INSTRUCTIONS
When out of 100 mcg tablets move to 112 mcg tablets taking them one every day  Follow up in 6 months  Call or message with concerns

## 2024-10-15 NOTE — ASSESSMENT & PLAN NOTE
Discussed course and recent labs  Currently tsh perfect at 1.4  Will adjust pill size to 112 mcg so can take one per day  Follow up in 6 months then yearly  Orders:    levothyroxine (Synthroid) 112 mcg tablet; Take 1 tablet (112 mcg) by mouth once daily in the morning. Take before meals.    TSH with reflex to Free T4 if abnormal; Future

## 2024-10-15 NOTE — PROGRESS NOTES
Endocrinology: Follow up visit  Subjective   Patient ID: Pauline Sun is a 39 y.o. female who presents for Hypothyroidism.    PCP: Lucrecia Palumbo MD    HPI  Since last visit adjusted t4 and currently on 100 x8 a week  Feeling pretty well.   Sometimes doesn't sleep great, sometimes baby, sometimes not  Taking t4 as directed  No ocps    Review of Systems   Constitutional:  Negative for chills, fatigue and fever.   Respiratory:  Negative for cough and shortness of breath.    Cardiovascular:  Negative for chest pain and palpitations.   Gastrointestinal:  Negative for diarrhea, nausea and vomiting.   Neurological:  Negative for headaches.       Patient Active Problem List   Diagnosis    Hypothyroidism    Multigravida of advanced maternal age in third trimester ([facility])    Placental abruption in second trimester ([facility])    Anemia affecting pregnancy in second trimester ([facility])    Pregnancy resulting from assisted reproductive technology in third trimester ([facility])    Elevated glucose tolerance test    Supervision of high risk pregnancy in third trimester ([facility])     delivery delivered ([facility])    Acute blood loss anemia        Home Meds:  Current Outpatient Medications   Medication Instructions    amoxicillin-pot clavulanate (Augmentin) 875-125 mg tablet 1 tablet, oral, Every 12 hours scheduled (0630,1830)    ascorbic acid (VITAMIN C ORAL) oral    cholecalciferol, vitamin D3, (VITAMIN D3 ORAL) oral    levothyroxine (SYNTHROID) 100 mcg, oral, Daily before breakfast    magnesium chloride 64 mg magnesium tablet oral    prenatal vitamin calcium-iron-folic 27 mg iron- 1 mg tablet 1 tablet, oral, Daily        No Known Allergies     Objective   Vitals:    10/15/24 0917   BP: 120/70   Pulse: 76   Resp: 16      Vitals:    10/15/24 0917   Weight: 74.6 kg (164 lb 6.4 oz)      Body mass index is 28.22 kg/m².   Physical Exam  Constitutional:       Appearance: Normal appearance. She is overweight.   HENT:       Head: Normocephalic and atraumatic.   Neck:      Thyroid: No thyroid mass, thyromegaly or thyroid tenderness.   Cardiovascular:      Rate and Rhythm: Normal rate and regular rhythm.      Heart sounds: No murmur heard.     No gallop.   Pulmonary:      Effort: Pulmonary effort is normal.      Breath sounds: Normal breath sounds.   Abdominal:      Palpations: Abdomen is soft.      Comments: benign   Neurological:      General: No focal deficit present.      Mental Status: She is alert and oriented to person, place, and time.      Deep Tendon Reflexes: Reflexes are normal and symmetric.   Psychiatric:         Behavior: Behavior is cooperative.         Labs:  Lab Results   Component Value Date    HGBA1C 5.6 09/26/2022    LDLDIRECT 102 03/05/2020    TSH 1.41 10/14/2024    FREET4 1.08 10/14/2024      Lab Results   Component Value Date    THYROIDPAB >1000 (A) 01/17/2023        Assessment/Plan   Assessment & Plan  Hypothyroidism, unspecified type  Discussed course and recent labs  Currently tsh perfect at 1.4  Will adjust pill size to 112 mcg so can take one per day  Follow up in 6 months then yearly  Orders:    levothyroxine (Synthroid) 112 mcg tablet; Take 1 tablet (112 mcg) by mouth once daily in the morning. Take before meals.    TSH with reflex to Free T4 if abnormal; Future        Electronically signed by:  Josee Glaser MD 10/15/24 9:18 AM

## 2025-03-28 LAB — TSH SERPL-ACNC: 2.79 MIU/L

## 2025-04-01 ENCOUNTER — APPOINTMENT (OUTPATIENT)
Dept: ENDOCRINOLOGY | Facility: CLINIC | Age: 40
End: 2025-04-01
Payer: COMMERCIAL

## 2025-04-01 VITALS
SYSTOLIC BLOOD PRESSURE: 120 MMHG | WEIGHT: 171.6 LBS | DIASTOLIC BLOOD PRESSURE: 70 MMHG | BODY MASS INDEX: 29.3 KG/M2 | RESPIRATION RATE: 16 BRPM | HEIGHT: 64 IN | HEART RATE: 76 BPM

## 2025-04-01 DIAGNOSIS — E03.9 HYPOTHYROIDISM, UNSPECIFIED TYPE: Primary | ICD-10-CM

## 2025-04-01 PROCEDURE — 3008F BODY MASS INDEX DOCD: CPT | Performed by: INTERNAL MEDICINE

## 2025-04-01 PROCEDURE — 1036F TOBACCO NON-USER: CPT | Performed by: INTERNAL MEDICINE

## 2025-04-01 PROCEDURE — 99213 OFFICE O/P EST LOW 20 MIN: CPT | Performed by: INTERNAL MEDICINE

## 2025-04-01 ASSESSMENT — ENCOUNTER SYMPTOMS
FATIGUE: 0
FEVER: 0
PALPITATIONS: 0
NAUSEA: 0
COUGH: 0
VOMITING: 0
CHILLS: 0
HEADACHES: 0
SHORTNESS OF BREATH: 0
DIARRHEA: 0

## 2025-04-01 NOTE — PROGRESS NOTES
Endocrinology: Follow up visit  Subjective   Patient ID: Pauline Sun is a 39 y.o. female who presents for Hypothyroidism.    PCP: Lucrecia Palumbo MD    HPI  Here for follow up hypothyroidism  Last time adjusted t4 to 112 mcg   Feeling fine  No complaints  Baby is doing well.  No plans for future pregnancies  Review of Systems   Constitutional:  Negative for chills, fatigue and fever.   Respiratory:  Negative for cough and shortness of breath.    Cardiovascular:  Negative for chest pain and palpitations.   Gastrointestinal:  Negative for diarrhea, nausea and vomiting.   Neurological:  Negative for headaches.       Patient Active Problem List   Diagnosis    Hypothyroidism    Multigravida of advanced maternal age in third trimester ([facility])    Placental abruption in second trimester ([facility])    Anemia affecting pregnancy in second trimester    Pregnancy resulting from assisted reproductive technology in third trimester ([facility])    Elevated glucose tolerance test    Supervision of high risk pregnancy in third trimester ([facility])     delivery delivered ([facility])    Acute blood loss anemia        Home Meds:  Current Outpatient Medications   Medication Instructions    ascorbic acid (VITAMIN C ORAL) Take by mouth.    cholecalciferol, vitamin D3, (VITAMIN D3 ORAL) Take by mouth.    levothyroxine (SYNTHROID) 112 mcg, oral, Daily before breakfast    prenatal vitamin calcium-iron-folic 27 mg iron- 1 mg tablet 1 tablet, Daily        No Known Allergies     Objective   Vitals:    25 1153   BP: 120/70   Pulse: 76   Resp: 16      Vitals:    25 1153   Weight: 77.8 kg (171 lb 9.6 oz)      Body mass index is 29.46 kg/m².   Physical Exam  Constitutional:       Appearance: Normal appearance. She is overweight.   HENT:      Head: Normocephalic and atraumatic.   Neck:      Thyroid: No thyroid mass, thyromegaly or thyroid tenderness.   Cardiovascular:      Rate and Rhythm: Normal rate and regular rhythm.       Heart sounds: No murmur heard.     No gallop.   Pulmonary:      Effort: Pulmonary effort is normal.      Breath sounds: Normal breath sounds.   Abdominal:      Palpations: Abdomen is soft.      Comments: benign   Neurological:      General: No focal deficit present.      Mental Status: She is alert and oriented to person, place, and time.      Deep Tendon Reflexes: Reflexes are normal and symmetric.   Psychiatric:         Behavior: Behavior is cooperative.         Labs:  Lab Results   Component Value Date    HGBA1C 5.6 09/26/2022    LDLDIRECT 102 03/05/2020    TSH 2.79 03/27/2025    FREET4 1.08 10/14/2024      Lab Results   Component Value Date    THYROIDPAB >1000 (A) 01/17/2023        Assessment/Plan   Assessment & Plan  Hypothyroidism, unspecified type    Blood work looks good.    Continue current thyroid med dose  Follow up in one year      Electronically signed by:  Josee Glaser MD 04/01/25 12:36 PM

## 2025-04-15 ENCOUNTER — APPOINTMENT (OUTPATIENT)
Dept: ENDOCRINOLOGY | Facility: CLINIC | Age: 40
End: 2025-04-15
Payer: COMMERCIAL

## 2025-06-10 ENCOUNTER — APPOINTMENT (OUTPATIENT)
Dept: OBSTETRICS AND GYNECOLOGY | Facility: CLINIC | Age: 40
End: 2025-06-10
Payer: COMMERCIAL

## 2025-06-12 ENCOUNTER — APPOINTMENT (OUTPATIENT)
Dept: OBSTETRICS AND GYNECOLOGY | Facility: CLINIC | Age: 40
End: 2025-06-12
Payer: COMMERCIAL

## 2025-06-26 ENCOUNTER — APPOINTMENT (OUTPATIENT)
Dept: OBSTETRICS AND GYNECOLOGY | Facility: CLINIC | Age: 40
End: 2025-06-26
Payer: COMMERCIAL

## 2025-06-26 VITALS
BODY MASS INDEX: 28.85 KG/M2 | WEIGHT: 169 LBS | DIASTOLIC BLOOD PRESSURE: 60 MMHG | HEIGHT: 64 IN | SYSTOLIC BLOOD PRESSURE: 98 MMHG

## 2025-06-26 DIAGNOSIS — Z12.31 ENCOUNTER FOR SCREENING MAMMOGRAM FOR MALIGNANT NEOPLASM OF BREAST: ICD-10-CM

## 2025-06-26 DIAGNOSIS — Z00.00 WELL WOMAN EXAM (NO GYNECOLOGICAL EXAM): Primary | ICD-10-CM

## 2025-06-26 PROCEDURE — 99396 PREV VISIT EST AGE 40-64: CPT | Performed by: ADVANCED PRACTICE MIDWIFE

## 2025-06-26 PROCEDURE — 3008F BODY MASS INDEX DOCD: CPT | Performed by: ADVANCED PRACTICE MIDWIFE

## 2025-06-26 PROCEDURE — 1036F TOBACCO NON-USER: CPT | Performed by: ADVANCED PRACTICE MIDWIFE

## 2025-06-26 RX ORDER — CALCIUM CARBONATE 300MG(750)
400 TABLET,CHEWABLE ORAL DAILY
COMMUNITY

## 2025-06-26 NOTE — PROGRESS NOTES
Assessment/Plan   Diagnoses and all orders for this visit:  Well woman exam (no gynecological exam)  Encounter for screening mammogram for malignant neoplasm of breast  -     BI mammo bilateral screening tomosynthesis; Future    Discussed lubricant and moisturizer use, consider vaginal estrogen in perimenopause  Lubricant samples given  Pap due 2027  To complete mammogram 6 months after stopping breastfeeding  Follow up in 1 year for annual well woman exam, or sooner as needed.  Denisse L Zahorsky, AVA-MAEGAN     Subjective   Paulinegale Sun is a 39 y.o. female who is here for a routine annual exam.     Patient's last menstrual period was 2025.  Periods are ever 24 days, lasting 3 days. Dysmenorrhea: none Cyclic symptoms include none. Denies intermenstrual bleeding, spotting, or discharge.    Sexual Activity: sexually active, male partners; Patient reports 1 partners in the last 12 months.  Pain with intercourse? Yes d/t dryness  Loss of desire? Situational, has a toddler  Able to have an orgasm? Yes     History of prior STI: none  Desires STI screening? No    Current contraception: none, declines  Condom use: sometimes    Last pap: 2022 NILM/HPV neg  History of abnormal Pap smear: no  Family history of breast, ovarian, pancreatic, or colon cancer: yes - father had colon cancer    Last mammogram: none yet d/t breastfeeding  History of abnormal mammogram: no  Last DEXA scan:never  Last colonoscopy: never, would like to get done    Social Hx:   Lives with:  and son   Feels safe at home? yes   Current/past IPV? no   Occupation:    Exercise: chases her toddler   Alcohol/Tobacco/Drug use: no    OB History    Para Term  AB Living   1 1 1 0 0 1   SAB IAB Ectopic Multiple Live Births   0 0 0 0 1   Obstetric Comments   AMA, 38 years old.  2023 03:51 PM - PU        NSTs at 36 weeks   2023 11:03 AM - JAKE [id number] false        growth 30, 36 weeks   2023 11:02 AM - JAKE  [id number] false        sp RR cfDNA - BOY   09/20/2023 10:52 AM - LZ       A1C Sept 2022 5.6%   09/20/2023 10:51 AM - LZ     BLOOD TYPE IS A+  07/11/2023 01:24 PM - MP     Clomid, Letrozole and IUI.  07/20/2023 03:52 PM - PU     Foresight negative  07/10/2023 12:47 PM - RS     Hashimoto's on Levo 100mcg qd          Objective   BP 98/60   Ht 1.626 m (5' 4\")   Wt 76.7 kg (169 lb)   LMP 06/19/2025   Breastfeeding Yes   BMI 29.01 kg/m²     General: Pleasant, cooperative, in no apparent distress.  Thyroid: Normal size, symmetrical, smooth and non-tender to palpation.  CV: Regular rate and rhythm.  Resp: Normal respiratory effort. Clear to auscultation bilaterally.  Breasts: Symmetrical, no skin changes, nipple discharge, erythema, masses. Non-tender to palpation.  Abdomen: Soft, non-tender.  : Deferred    "

## 2025-06-26 NOTE — PATIENT INSTRUCTIONS
Thank you for coming to see me for your visit today and, most importantly, thank you for having a preventative visit.  If lab work was sent, you will see your test result via [employer] and will be notified of any pertinent abnormalities.  Any prescriptions will be sent electronically to your pharmacy listed.  Follow up in 1 year for your annual exam or sooner as needed.    I look forward to seeing you next year for your health care needs.  Please remember:   Sleep is important - make it a priority to get at least 6 hours per night!   Exercise, such as walking for 20 minutes per day, is beneficial to your physical and mental health.   Healthy diets can be expensive. If you ever feel like you are struggling to afford healthy food, please let me know, as we have a very good program called [employer] that is available to you.   Decrease alcohol and tobacco intake. A goal of less than 7 alcoholic drinks per week can reduce your risk of breast and colon cancer by 38%!    Be well!  Denisse

## 2026-03-27 ENCOUNTER — APPOINTMENT (OUTPATIENT)
Facility: CLINIC | Age: 41
End: 2026-03-27
Payer: COMMERCIAL

## (undated) DEVICE — SUTURE, VICRYL, 0, 36 IN, CT, UNDYED

## (undated) DEVICE — SUTURE, PDS II, 0, 60 IN, CTX, VIOLET

## (undated) DEVICE — SUTURE, CHROMIC, 0, 54 IN, TIE, BROWN

## (undated) DEVICE — SUTURE, VICRYL, 4-0, 27 IN, PS-1, UNDYED

## (undated) DEVICE — DRAPE PACK, CESAREAN SECTION, CUSTOM, UHC